# Patient Record
Sex: MALE | Race: WHITE | NOT HISPANIC OR LATINO | Employment: OTHER | URBAN - METROPOLITAN AREA
[De-identification: names, ages, dates, MRNs, and addresses within clinical notes are randomized per-mention and may not be internally consistent; named-entity substitution may affect disease eponyms.]

---

## 2019-08-03 ENCOUNTER — TRANSFERRED RECORDS (OUTPATIENT)
Dept: HEALTH INFORMATION MANAGEMENT | Facility: CLINIC | Age: 59
End: 2019-08-03

## 2019-08-03 LAB — INR PPP: 1.1 (ref 0.9–1.2)

## 2019-08-07 LAB — INR PPP: 1.1 (ref 0.9–1.2)

## 2019-09-03 ENCOUNTER — TRANSFERRED RECORDS (OUTPATIENT)
Dept: HEALTH INFORMATION MANAGEMENT | Facility: CLINIC | Age: 59
End: 2019-09-03

## 2019-09-11 ENCOUNTER — TRANSFERRED RECORDS (OUTPATIENT)
Dept: HEALTH INFORMATION MANAGEMENT | Facility: CLINIC | Age: 59
End: 2019-09-11

## 2019-09-17 ENCOUNTER — TRANSFERRED RECORDS (OUTPATIENT)
Dept: HEALTH INFORMATION MANAGEMENT | Facility: CLINIC | Age: 59
End: 2019-09-17

## 2019-09-25 ENCOUNTER — TRANSFERRED RECORDS (OUTPATIENT)
Dept: HEALTH INFORMATION MANAGEMENT | Facility: CLINIC | Age: 59
End: 2019-09-25

## 2019-09-30 ENCOUNTER — TRANSFERRED RECORDS (OUTPATIENT)
Dept: HEALTH INFORMATION MANAGEMENT | Facility: CLINIC | Age: 59
End: 2019-09-30

## 2019-10-15 ENCOUNTER — TRANSFERRED RECORDS (OUTPATIENT)
Dept: HEALTH INFORMATION MANAGEMENT | Facility: CLINIC | Age: 59
End: 2019-10-15

## 2019-10-16 ENCOUNTER — TRANSFERRED RECORDS (OUTPATIENT)
Dept: HEALTH INFORMATION MANAGEMENT | Facility: CLINIC | Age: 59
End: 2019-10-16

## 2019-10-21 ENCOUNTER — OFFICE VISIT (OUTPATIENT)
Dept: FAMILY MEDICINE | Facility: CLINIC | Age: 59
End: 2019-10-21
Payer: COMMERCIAL

## 2019-10-21 ENCOUNTER — TELEPHONE (OUTPATIENT)
Dept: FAMILY MEDICINE | Facility: CLINIC | Age: 59
End: 2019-10-21

## 2019-10-21 ENCOUNTER — TRANSFERRED RECORDS (OUTPATIENT)
Dept: HEALTH INFORMATION MANAGEMENT | Facility: CLINIC | Age: 59
End: 2019-10-21

## 2019-10-21 VITALS
WEIGHT: 262 LBS | OXYGEN SATURATION: 96 % | RESPIRATION RATE: 16 BRPM | DIASTOLIC BLOOD PRESSURE: 82 MMHG | TEMPERATURE: 97.5 F | HEART RATE: 80 BPM | SYSTOLIC BLOOD PRESSURE: 132 MMHG

## 2019-10-21 DIAGNOSIS — Z12.11 SPECIAL SCREENING FOR MALIGNANT NEOPLASMS, COLON: ICD-10-CM

## 2019-10-21 DIAGNOSIS — Z01.818 PREOP GENERAL PHYSICAL EXAM: Primary | ICD-10-CM
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
## 2019-10-21 DIAGNOSIS — S42.202D CLOSED FRACTURE OF PROXIMAL END OF LEFT HUMERUS WITH ROUTINE HEALING, UNSPECIFIED FRACTURE MORPHOLOGY, SUBSEQUENT ENCOUNTER: ICD-10-CM
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
## 2019-10-21 LAB — HGB BLD-MCNC: 14.9 G/DL (ref 13.3–17.7)

## 2019-10-21 PROCEDURE — 85018 HEMOGLOBIN: CPT | Performed by: FAMILY MEDICINE

## 2019-10-21 PROCEDURE — 36415 COLL VENOUS BLD VENIPUNCTURE: CPT | Performed by: FAMILY MEDICINE

## 2019-10-21 PROCEDURE — 93000 ELECTROCARDIOGRAM COMPLETE: CPT | Performed by: FAMILY MEDICINE

## 2019-10-21 PROCEDURE — 99204 OFFICE O/P NEW MOD 45 MIN: CPT | Performed by: FAMILY MEDICINE

## 2019-10-21 RX ORDER — HYDROCODONE BITARTRATE AND ACETAMINOPHEN 5; 325 MG/1; MG/1
TABLET ORAL
Refills: 0 | COMMUNITY
Start: 2019-09-23 | End: 2021-11-09

## 2019-10-21 SDOH — HEALTH STABILITY: MENTAL HEALTH: HOW OFTEN DO YOU HAVE A DRINK CONTAINING ALCOHOL?: NEVER

## 2019-10-21 NOTE — TELEPHONE ENCOUNTER
Message   Received: Today   Message Contents   Heena Best MD  P Fz Team Red             Need vascular surgery Notes today-Pt has surgery Tomorrow   Fax preop/labs/EKG if done to hospital
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
## 2019-10-21 NOTE — TELEPHONE ENCOUNTER
Called 2 times to [facility] to get notes per providers request. They were given the fax number to the Red Team.    [address], [address]  [place] [address]    Phone: [phone]    Patient is not to have surgery they are to have a graph put in at [place].     They can not fax over [facility]'s notes until the provider has signed them.    Note is currently unsigned by the provider Heena Best MD.    Will send to the provider to review. Roxanne Farr,

## 2019-10-22 NOTE — TELEPHONE ENCOUNTER
Called [facility] back. They had left a voicemail on the TC line asking for the Pre-op. Let them know we where sending it. Roxanne Farr,

## 2019-10-22 NOTE — TELEPHONE ENCOUNTER
Note has been completed. Confirmed fax of signed office note/pre-op 10/21/2019 to [facility]. Roxanne Farr,

## 2019-10-25 ENCOUNTER — OFFICE VISIT (OUTPATIENT)
Dept: FAMILY MEDICINE | Facility: CLINIC | Age: 59
End: 2019-10-25
Payer: COMMERCIAL

## 2019-10-25 VITALS
HEIGHT: 73 IN | OXYGEN SATURATION: 96 % | SYSTOLIC BLOOD PRESSURE: 126 MMHG | WEIGHT: 262.4 LBS | RESPIRATION RATE: 22 BRPM | HEART RATE: 87 BPM | DIASTOLIC BLOOD PRESSURE: 88 MMHG | BODY MASS INDEX: 34.78 KG/M2 | TEMPERATURE: 97.6 F

## 2019-10-25 DIAGNOSIS — G89.29 CHRONIC LOW BACK PAIN WITHOUT SCIATICA, UNSPECIFIED BACK PAIN LATERALITY: ICD-10-CM

## 2019-10-25 DIAGNOSIS — Z12.5 SCREENING FOR PROSTATE CANCER: ICD-10-CM

## 2019-10-25 DIAGNOSIS — E55.9 VITAMIN D DEFICIENCY: ICD-10-CM

## 2019-10-25 DIAGNOSIS — Z00.00 WELL ADULT EXAM: Primary | ICD-10-CM

## 2019-10-25 DIAGNOSIS — R35.0 URINARY FREQUENCY: ICD-10-CM

## 2019-10-25 DIAGNOSIS — K21.9 CHRONIC GERD: ICD-10-CM

## 2019-10-25 DIAGNOSIS — Z13.220 ENCOUNTER FOR LIPID SCREENING FOR CARDIOVASCULAR DISEASE: ICD-10-CM

## 2019-10-25 DIAGNOSIS — Z12.11 SPECIAL SCREENING FOR MALIGNANT NEOPLASMS, COLON: ICD-10-CM

## 2019-10-25 DIAGNOSIS — Z13.6 ENCOUNTER FOR LIPID SCREENING FOR CARDIOVASCULAR DISEASE: ICD-10-CM

## 2019-10-25 DIAGNOSIS — R13.19 ESOPHAGEAL DYSPHAGIA: ICD-10-CM

## 2019-10-25 DIAGNOSIS — M54.50 CHRONIC LOW BACK PAIN WITHOUT SCIATICA, UNSPECIFIED BACK PAIN LATERALITY: ICD-10-CM

## 2019-10-25 LAB
ALBUMIN UR-MCNC: NEGATIVE MG/DL
APPEARANCE UR: CLEAR
BACTERIA #/AREA URNS HPF: ABNORMAL /HPF
BILIRUB UR QL STRIP: NEGATIVE
COLOR UR AUTO: YELLOW
ERYTHROCYTE [DISTWIDTH] IN BLOOD BY AUTOMATED COUNT: 14.7 % (ref 10–15)
GLUCOSE UR STRIP-MCNC: NEGATIVE MG/DL
HCT VFR BLD AUTO: 46.8 % (ref 40–53)
HGB BLD-MCNC: 15.3 G/DL (ref 13.3–17.7)
HGB UR QL STRIP: NEGATIVE
KETONES UR STRIP-MCNC: NEGATIVE MG/DL
LEUKOCYTE ESTERASE UR QL STRIP: NEGATIVE
MCH RBC QN AUTO: 27.9 PG (ref 26.5–33)
MCHC RBC AUTO-ENTMCNC: 32.7 G/DL (ref 31.5–36.5)
MCV RBC AUTO: 85 FL (ref 78–100)
NITRATE UR QL: NEGATIVE
NON-SQ EPI CELLS #/AREA URNS LPF: ABNORMAL /LPF
PH UR STRIP: 5.5 PH (ref 5–7)
PLATELET # BLD AUTO: 191 10E9/L (ref 150–450)
RBC # BLD AUTO: 5.48 10E12/L (ref 4.4–5.9)
RBC #/AREA URNS AUTO: ABNORMAL /HPF
SOURCE: ABNORMAL
SP GR UR STRIP: 1.02 (ref 1–1.03)
UROBILINOGEN UR STRIP-ACNC: 0.2 EU/DL (ref 0.2–1)
WBC # BLD AUTO: 8.6 10E9/L (ref 4–11)
WBC #/AREA URNS AUTO: ABNORMAL /HPF

## 2019-10-25 PROCEDURE — 36415 COLL VENOUS BLD VENIPUNCTURE: CPT | Performed by: FAMILY MEDICINE

## 2019-10-25 PROCEDURE — 81001 URINALYSIS AUTO W/SCOPE: CPT | Performed by: FAMILY MEDICINE

## 2019-10-25 PROCEDURE — G0103 PSA SCREENING: HCPCS | Performed by: FAMILY MEDICINE

## 2019-10-25 PROCEDURE — 99396 PREV VISIT EST AGE 40-64: CPT | Performed by: FAMILY MEDICINE

## 2019-10-25 PROCEDURE — 85027 COMPLETE CBC AUTOMATED: CPT | Performed by: FAMILY MEDICINE

## 2019-10-25 PROCEDURE — 80053 COMPREHEN METABOLIC PANEL: CPT | Performed by: FAMILY MEDICINE

## 2019-10-25 PROCEDURE — 80061 LIPID PANEL: CPT | Performed by: FAMILY MEDICINE

## 2019-10-25 PROCEDURE — 82306 VITAMIN D 25 HYDROXY: CPT | Performed by: FAMILY MEDICINE

## 2019-10-25 PROCEDURE — 99214 OFFICE O/P EST MOD 30 MIN: CPT | Mod: 25 | Performed by: FAMILY MEDICINE

## 2019-10-25 RX ORDER — PHENOL 1.4 %
600 AEROSOL, SPRAY (ML) MUCOUS MEMBRANE 2 TIMES DAILY WITH MEALS
COMMUNITY
End: 2021-11-09

## 2019-10-25 RX ORDER — ASPIRIN 81 MG/1
81 TABLET ORAL DAILY
COMMUNITY
End: 2021-11-09

## 2019-10-25 RX ORDER — ACETAMINOPHEN 160 MG
2000 TABLET,DISINTEGRATING ORAL DAILY
COMMUNITY
End: 2021-11-09

## 2019-10-25 RX ORDER — DOCUSATE SODIUM 100 MG/1
100 CAPSULE, LIQUID FILLED ORAL 2 TIMES DAILY PRN
COMMUNITY
End: 2021-11-09

## 2019-10-25 ASSESSMENT — ENCOUNTER SYMPTOMS
FREQUENCY: 1
ABDOMINAL PAIN: 0
CHILLS: 0
HEARTBURN: 0
DIARRHEA: 0
FEVER: 0
HEMATURIA: 0
SHORTNESS OF BREATH: 1
WEAKNESS: 1
COUGH: 1
PALPITATIONS: 0
CONSTIPATION: 0
DIZZINESS: 1
SORE THROAT: 0
MYALGIAS: 1
JOINT SWELLING: 1
EYE PAIN: 0
ARTHRALGIAS: 1
PARESTHESIAS: 0
HEMATOCHEZIA: 0
HEADACHES: 0
DYSURIA: 0
NERVOUS/ANXIOUS: 1
NAUSEA: 0

## 2019-10-25 ASSESSMENT — MIFFLIN-ST. JEOR: SCORE: 2072.73

## 2019-10-25 NOTE — LETTER
[address] [place] [address]    October 31, 2019    Mauro Juárez  [address] [address]          Dear Mauro,    Your most recent labs are not concerning at this time.  Your Liver function, kidney function and electrolytes are all normal.  PSA is normal.  You have a healthy cholesterol panel as your LDL (bad cholesterol) is at 100, and your HDL (good cholesterol) is well above 45.  Please continue your healthy diet and regular exercise regimen discussed in clinic, and please let me know if you have any questions.     Enclosed is a copy of your results.     Results for orders placed or performed in visit on 10/25/19   Lipid panel reflex to direct LDL Non-fasting   Result Value Ref Range    Cholesterol 188 <200 mg/dL    Triglycerides 74 <150 mg/dL    HDL Cholesterol 73 >39 mg/dL    LDL Cholesterol Calculated 100 (H) <100 mg/dL    Non HDL Cholesterol 115 <130 mg/dL   CBC with platelets   Result Value Ref Range    WBC 8.6 4.0 - 11.0 10e9/L    RBC Count 5.48 4.4 - 5.9 10e12/L    Hemoglobin 15.3 13.3 - 17.7 g/dL    Hematocrit 46.8 40.0 - 53.0 %    MCV 85 78 - 100 fl    MCH 27.9 26.5 - 33.0 pg    MCHC 32.7 31.5 - 36.5 g/dL    RDW 14.7 10.0 - 15.0 %    Platelet Count 191 150 - 450 10e9/L   Comprehensive metabolic panel   Result Value Ref Range    Sodium 140 133 - 144 mmol/L    Potassium 4.2 3.4 - 5.3 mmol/L    Chloride 106 94 - 109 mmol/L    Carbon Dioxide 26 20 - 32 mmol/L    Anion Gap 8 3 - 14 mmol/L    Glucose 90 70 - 99 mg/dL    Urea Nitrogen 17 7 - 30 mg/dL    Creatinine 0.77 0.66 - 1.25 mg/dL    GFR Estimate >90 >60 mL/min/[1.73_m2]    GFR Estimate If Black >90 >60 mL/min/[1.73_m2]    Calcium 9.9 8.5 - 10.1 mg/dL    Bilirubin Total 0.7 0.2 - 1.3 mg/dL    Albumin 4.1 3.4 - 5.0 g/dL    Protein Total 8.4 6.8 - 8.8 g/dL    Alkaline Phosphatase 178 (H) 40 - 150 U/L    ALT 54 0 - 70 U/L    AST 28 0 - 45 U/L   PSA,  screen   Result Value Ref Range    PSA 1.39 0 - 4 ug/L   UA with Microscopic reflex to Culture   Result Value Ref Range    Color Urine Yellow     Appearance Urine Clear     Glucose Urine Negative NEG^Negative mg/dL    Bilirubin Urine Negative NEG^Negative    Ketones Urine Negative NEG^Negative mg/dL    Specific Gravity Urine 1.025 1.003 - 1.035    pH Urine 5.5 5.0 - 7.0 pH    Protein Albumin Urine Negative NEG^Negative mg/dL    Urobilinogen Urine 0.2 0.2 - 1.0 EU/dL    Nitrite Urine Negative NEG^Negative    Blood Urine Negative NEG^Negative    Leukocyte Esterase Urine Negative NEG^Negative    Source Midstream Urine     WBC Urine 0 - 5 OTO5^0 - 5 /HPF    RBC Urine O - 2 OTO2^O - 2 /HPF    Squamous Epithelial /LPF Urine Few FEW^Few /LPF    Bacteria Urine Few (A) NEG^Negative /HPF   Vitamin D deficiency screening   Result Value Ref Range    Vitamin D Deficiency screening 38 20 - 75 ug/L       If you have any questions or concerns, please call myself or my nurse at [phone].      Sincerely,        Jn Gregory MD/carmen
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
## 2019-10-27 ENCOUNTER — MYC MEDICAL ADVICE (OUTPATIENT)
Dept: FAMILY MEDICINE | Facility: CLINIC | Age: 59
End: 2019-10-27

## 2019-10-28 ENCOUNTER — HEALTH MAINTENANCE LETTER (OUTPATIENT)
Age: 59
End: 2019-10-28

## 2019-10-28 ENCOUNTER — TELEPHONE (OUTPATIENT)
Dept: PALLIATIVE MEDICINE | Facility: CLINIC | Age: 59
End: 2019-10-28

## 2019-10-28 LAB
ALBUMIN SERPL-MCNC: 4.1 G/DL (ref 3.4–5)
ALP SERPL-CCNC: 178 U/L (ref 40–150)
ALT SERPL W P-5'-P-CCNC: 54 U/L (ref 0–70)
ANION GAP SERPL CALCULATED.3IONS-SCNC: 8 MMOL/L (ref 3–14)
AST SERPL W P-5'-P-CCNC: 28 U/L (ref 0–45)
BILIRUB SERPL-MCNC: 0.7 MG/DL (ref 0.2–1.3)
BUN SERPL-MCNC: 17 MG/DL (ref 7–30)
CALCIUM SERPL-MCNC: 9.9 MG/DL (ref 8.5–10.1)
CHLORIDE SERPL-SCNC: 106 MMOL/L (ref 94–109)
CHOLEST SERPL-MCNC: 188 MG/DL
CO2 SERPL-SCNC: 26 MMOL/L (ref 20–32)
CREAT SERPL-MCNC: 0.77 MG/DL (ref 0.66–1.25)
DEPRECATED CALCIDIOL+CALCIFEROL SERPL-MC: 38 UG/L (ref 20–75)
GFR SERPL CREATININE-BSD FRML MDRD: >90 ML/MIN/{1.73_M2}
GLUCOSE SERPL-MCNC: 90 MG/DL (ref 70–99)
HDLC SERPL-MCNC: 73 MG/DL
LDLC SERPL CALC-MCNC: 100 MG/DL
NONHDLC SERPL-MCNC: 115 MG/DL
POTASSIUM SERPL-SCNC: 4.2 MMOL/L (ref 3.4–5.3)
PROT SERPL-MCNC: 8.4 G/DL (ref 6.8–8.8)
PSA SERPL-ACNC: 1.39 UG/L (ref 0–4)
SODIUM SERPL-SCNC: 140 MMOL/L (ref 133–144)
TRIGL SERPL-MCNC: 74 MG/DL

## 2019-10-28 NOTE — TELEPHONE ENCOUNTER

## 2019-10-30 ENCOUNTER — TELEPHONE (OUTPATIENT)
Dept: FAMILY MEDICINE | Facility: CLINIC | Age: 59
End: 2019-10-30

## 2019-10-30 NOTE — TELEPHONE ENCOUNTER
"Dr. Gregory,  Please see phone message below from patient. This is second message form patient.   Golden Gekkot message 10/27 stated: \"My pharmacy did not get the prescription for the enlarged prostate.  It should be sent to [facility] in [facility] [place] on [address] Thank you\"    Pharmacy cued.  "
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
## 2019-10-30 NOTE — TELEPHONE ENCOUNTER
Reason for Call:  Other prescription    Detailed comments: Prescription didn't show up in [employer] in [place], wondering if it was sent somewhere else. Wants call back.      Phone Number Patient can be reached at: Other phone number: [phone]    Mobile number: [phone]      Best Time: any    Can we leave a detailed message on this number? YES    Call taken on 10/30/2019 at 12:21 PM by Valdez Meza

## 2019-10-31 ENCOUNTER — OFFICE VISIT (OUTPATIENT)
Dept: PALLIATIVE MEDICINE | Facility: CLINIC | Age: 59
End: 2019-10-31
Attending: FAMILY MEDICINE
Payer: COMMERCIAL

## 2019-10-31 VITALS
BODY MASS INDEX: 34.35 KG/M2 | HEART RATE: 56 BPM | DIASTOLIC BLOOD PRESSURE: 99 MMHG | SYSTOLIC BLOOD PRESSURE: 150 MMHG | WEIGHT: 262 LBS

## 2019-10-31 DIAGNOSIS — M25.512 CHRONIC PAIN OF BOTH SHOULDERS: ICD-10-CM

## 2019-10-31 DIAGNOSIS — M79.605 CHRONIC PAIN OF LEFT LOWER EXTREMITY: ICD-10-CM

## 2019-10-31 DIAGNOSIS — G89.4 PAIN SYNDROME, CHRONIC: ICD-10-CM

## 2019-10-31 DIAGNOSIS — G89.29 CHRONIC PAIN OF LEFT LOWER EXTREMITY: ICD-10-CM

## 2019-10-31 DIAGNOSIS — G89.29 CHRONIC PAIN OF BOTH SHOULDERS: ICD-10-CM

## 2019-10-31 DIAGNOSIS — N40.1 BENIGN PROSTATIC HYPERPLASIA WITH LOWER URINARY TRACT SYMPTOMS, SYMPTOM DETAILS UNSPECIFIED: Primary | ICD-10-CM

## 2019-10-31 DIAGNOSIS — M54.50 BILATERAL LOW BACK PAIN WITHOUT SCIATICA, UNSPECIFIED CHRONICITY: Primary | ICD-10-CM

## 2019-10-31 DIAGNOSIS — M25.511 CHRONIC PAIN OF BOTH SHOULDERS: ICD-10-CM

## 2019-10-31 PROCEDURE — 99207 ZZC CDG-MDM COMPONENT: MEETS LOW - DOWN CODED: CPT | Performed by: NURSE PRACTITIONER

## 2019-10-31 PROCEDURE — 99204 OFFICE O/P NEW MOD 45 MIN: CPT | Performed by: NURSE PRACTITIONER

## 2019-10-31 RX ORDER — GABAPENTIN 100 MG/1
CAPSULE ORAL
Qty: 180 CAPSULE | Refills: 0 | Status: SHIPPED | OUTPATIENT
Start: 2019-10-31 | End: 2019-12-18

## 2019-10-31 RX ORDER — TAMSULOSIN HYDROCHLORIDE 0.4 MG/1
0.4 CAPSULE ORAL DAILY
Qty: 90 CAPSULE | Refills: 0 | Status: SHIPPED | OUTPATIENT
Start: 2019-10-31 | End: 2020-01-28

## 2019-10-31 ASSESSMENT — PAIN SCALES - GENERAL: PAINLEVEL: MODERATE PAIN (5)

## 2019-10-31 NOTE — PATIENT INSTRUCTIONS
After Visit Instructions:     Thank you for coming to [facility] for your care. It is my goal to partner with you to help you reach your optimal state of health.     Continue daily self-care, identifying contributing factors, and monitoring variations in pain level. Continue to integrate self-care into your life.      1. Schedule physical therapy assessment with Bhavya  2. Schedule follow-up with CARA Padilla NP-C a few days after MRI  3. Imaging: MRI of low back   [facility]:   Please call to schedule: [phone]  4. Procedures recommended: Will review at next appt   5. Order for TENS unit provided.   6. Medication recommendations:   1. Gabapentin 100 mg   Here is how you should take your Neurontin  (gabapentin):  Day 1: _______________Take one 100 mg capsule at bedtime for three days.  Day 4: _______________Take one 100 mg capsule in the morning and one 100 mg capsule  at bedtime. Do this for three days.  Day 7:_______________ Take one 100 mg capsule three times a day.  Day 10: ______________Take one 100 mg capsule in the morning, one 100 mg capsule midday and two 100 mg capsules at bedtime.  Day 13:______________ Take two 100 mg capsules in the morning, one 100 mg capsule  midday and two 100mg capsules at bedtime.  Day 16:______________Take two 100 mg capsules three times a day.  Day 19:______________Take two 100mg capsules in the morning, two 100mg capsules  midday and three 100mg capsules at bedtime.  Day 22:______________Take three 100mg capsules in the morning, two 100mg capsules  midday and three 100mg tablets at bedtime.  Day 25: ______________Take three 100 mg tablets three times a day. Continue at this dose.    Additional Instructions  Caution for sedation.    Do not drive until you know how the medication affects you.   You can increase more slowly if you need to.  Do not stop abruptly once at higher doses.  This medication must be tapered off.      Juliette RAO  CARA Kee, NP-C  [facility]  [facility]    Clinic Number:  [phone]     Call with any questions about your care and for scheduling assistance.     Calls are returned Monday through Friday between 8 AM and 4:30 PM. We usually get back to you within 2 business days depending on the issue/request.    If we are prescribing your medications:    For opioid medication refills, call the clinic or send a [employer] message 7 days in advance.  Please include:    Name of requested medication    Name of the pharmacy.    For non-opioid medications, call your pharmacy directly to request a refill. Please allow 3-4 days to be processed.     Per [place] State Law:    All controlled substance prescriptions must be filled within 30 days of being written.      For those controlled substances allowing refills, pickup must occur within 30 days of last fill.      We believe regular attendance is key to your success in our program!      Any time you are unable to keep your appointment we ask that you call us at least 24 hours in advance to cancel.This will allow us to offer the appointment time to another patient.   Multiple missed appointments may lead to dismissal from the clinic.

## 2019-11-04 ENCOUNTER — OFFICE VISIT (OUTPATIENT)
Dept: GASTROENTEROLOGY | Facility: CLINIC | Age: 59
End: 2019-11-04
Attending: FAMILY MEDICINE
Payer: COMMERCIAL

## 2019-11-04 VITALS
HEART RATE: 79 BPM | BODY MASS INDEX: 35.92 KG/M2 | WEIGHT: 271 LBS | OXYGEN SATURATION: 97 % | HEIGHT: 73 IN | DIASTOLIC BLOOD PRESSURE: 88 MMHG | SYSTOLIC BLOOD PRESSURE: 129 MMHG

## 2019-11-04 DIAGNOSIS — Z12.11 ENCOUNTER FOR SCREENING COLONOSCOPY: ICD-10-CM

## 2019-11-04 DIAGNOSIS — Z79.1 NSAID LONG-TERM USE: ICD-10-CM

## 2019-11-04 DIAGNOSIS — K21.9 GASTROESOPHAGEAL REFLUX DISEASE, ESOPHAGITIS PRESENCE NOT SPECIFIED: ICD-10-CM

## 2019-11-04 DIAGNOSIS — R13.10 DYSPHAGIA, UNSPECIFIED TYPE: Primary | ICD-10-CM

## 2019-11-04 PROCEDURE — 99204 OFFICE O/P NEW MOD 45 MIN: CPT | Performed by: PHYSICIAN ASSISTANT

## 2019-11-04 RX ORDER — OMEPRAZOLE 40 MG/1
40 CAPSULE, DELAYED RELEASE ORAL EVERY MORNING
Qty: 30 CAPSULE | Refills: 2 | Status: SHIPPED | OUTPATIENT
Start: 2019-11-04 | End: 2021-11-09

## 2019-11-04 ASSESSMENT — MIFFLIN-ST. JEOR: SCORE: 2111.78

## 2019-11-04 ASSESSMENT — PAIN SCALES - GENERAL: PAINLEVEL: SEVERE PAIN (7)

## 2019-11-04 NOTE — PATIENT INSTRUCTIONS
Thank you for visiting our Gastroenterology office today.     Start taking omeprazole 40 mg every morning on an empty stomach about 30 minutes before breakfast.     Please call [phone] to schedule an upper endoscopy and colonoscopy with Dr. Liz or Dr. Cheek.     Schedule a follow up for 2-3 weeks after your procedure to review results and discuss next steps if any are needed.
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
## 2019-11-04 NOTE — NURSING NOTE
"Mauro Juárez's goals for this visit include:   Chief Complaint   Patient presents with     Consult     Patient states that when he swallows food sometimes it gets stuck in his lower esophagus; GERD issues       He requests these members of his care team be copied on today's visit information: PCP    PCP: Jn Gregory    Referring Provider:  Jn Gregory MD  [address] [place] [address]    /88 (BP Location: Right arm, Patient Position: Sitting, Cuff Size: Adult Regular)   Pulse 79   Ht 1.86 m (6' 1.23\")   Wt 122.9 kg (271 lb)   SpO2 97%   BMI 35.53 kg/m      Do you need any medication refills at today's visit? No    Neeta Head LPN      "
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
## 2019-11-07 PROBLEM — G89.4 CHRONIC PAIN SYNDROME: Status: ACTIVE | Noted: 2019-11-07

## 2019-11-11 ENCOUNTER — OFFICE VISIT (OUTPATIENT)
Dept: PALLIATIVE MEDICINE | Facility: CLINIC | Age: 59
End: 2019-11-11
Payer: COMMERCIAL

## 2019-11-11 DIAGNOSIS — G89.29 CHRONIC PAIN OF LEFT LOWER EXTREMITY: ICD-10-CM

## 2019-11-11 DIAGNOSIS — M79.605 CHRONIC PAIN OF LEFT LOWER EXTREMITY: ICD-10-CM

## 2019-11-11 DIAGNOSIS — G89.4 CHRONIC PAIN SYNDROME: Primary | ICD-10-CM

## 2019-11-11 DIAGNOSIS — M54.50 BILATERAL LOW BACK PAIN WITHOUT SCIATICA, UNSPECIFIED CHRONICITY: ICD-10-CM

## 2019-11-11 PROCEDURE — 97112 NEUROMUSCULAR REEDUCATION: CPT | Mod: GP | Performed by: PHYSICAL THERAPIST

## 2019-11-11 PROCEDURE — 97162 PT EVAL MOD COMPLEX 30 MIN: CPT | Mod: GP | Performed by: PHYSICAL THERAPIST
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
## 2019-11-14 ENCOUNTER — TELEPHONE (OUTPATIENT)
Dept: GASTROENTEROLOGY | Facility: CLINIC | Age: 59
End: 2019-11-14

## 2019-11-14 ENCOUNTER — ANCILLARY PROCEDURE (OUTPATIENT)
Dept: MRI IMAGING | Facility: CLINIC | Age: 59
End: 2019-11-14
Attending: NURSE PRACTITIONER
Payer: COMMERCIAL

## 2019-11-14 DIAGNOSIS — M54.50 BILATERAL LOW BACK PAIN WITHOUT SCIATICA, UNSPECIFIED CHRONICITY: ICD-10-CM

## 2019-11-14 PROBLEM — M79.605 CHRONIC PAIN OF LEFT LOWER EXTREMITY: Status: ACTIVE | Noted: 2019-11-14

## 2019-11-14 PROBLEM — G89.29 CHRONIC PAIN OF LEFT LOWER EXTREMITY: Status: ACTIVE | Noted: 2019-11-14

## 2019-11-14 PROCEDURE — 72148 MRI LUMBAR SPINE W/O DYE: CPT | Mod: TC

## 2019-11-14 NOTE — TELEPHONE ENCOUNTER
Pt's wife spoke to Loly and scheduled procedures with Dr. Liz. She has no other questions.    Dedra Lynn RN  Gastroenterology Care Coordinator  [place], [place]

## 2019-11-14 NOTE — TELEPHONE ENCOUNTER
[facility] Call Center    Phone Message    May a detailed message be left on voicemail: yes    Reason for Call: Other: Appointment for MAC EGD and Colonoscopy.      Patients wife states shes tried many times to get it scheduled and no one has helped her make it.     Action Taken: Message routed to:  Adult Clinics: Gastroenterology (GI) p [phone]

## 2019-11-18 ENCOUNTER — OFFICE VISIT (OUTPATIENT)
Dept: PALLIATIVE MEDICINE | Facility: CLINIC | Age: 59
End: 2019-11-18
Payer: COMMERCIAL

## 2019-11-18 VITALS
DIASTOLIC BLOOD PRESSURE: 85 MMHG | WEIGHT: 271 LBS | HEART RATE: 86 BPM | BODY MASS INDEX: 35.53 KG/M2 | SYSTOLIC BLOOD PRESSURE: 130 MMHG

## 2019-11-18 DIAGNOSIS — M48.061 SPINAL STENOSIS OF LUMBAR REGION WITHOUT NEUROGENIC CLAUDICATION: ICD-10-CM

## 2019-11-18 DIAGNOSIS — M51.369 DDD (DEGENERATIVE DISC DISEASE), LUMBAR: Primary | ICD-10-CM

## 2019-11-18 PROCEDURE — 99214 OFFICE O/P EST MOD 30 MIN: CPT | Performed by: NURSE PRACTITIONER

## 2019-11-18 ASSESSMENT — PAIN SCALES - GENERAL: PAINLEVEL: MILD PAIN (3)
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
## 2019-11-18 NOTE — PATIENT INSTRUCTIONS
After Visit Instructions:     Thank you for coming to [facility] for your care. It is my goal to partner with you to help you reach your optimal state of health.     Continue daily self-care, identifying contributing factors, and monitoring variations in pain level. Continue to integrate self-care into your life.      1. Schedule physical therapy visits per Bhavya's recommendations   2. Schedule follow-up with CARA Padilla NP-C in 3 months or sooner as needed.   3. Procedures recommended: Lumbar epidural steroid injection at  L 3-4, 4-5. Let us know if you want to proceed with this injection.   4. Medication recommendations:   1. Ok to increase Gabapentin as discussed.     CARA Fish, NP-C  [facility] [place] [facility] [place]    Clinic Number:  [phone]     Call with any questions about your care and for scheduling assistance.     Calls are returned Monday through Friday between 8 AM and 4:30 PM. We usually get back to you within 2 business days depending on the issue/request.    If we are prescribing your medications:    For opioid medication refills, call the clinic or send a Spacenet message 7 days in advance.  Please include:    Name of requested medication    Name of the pharmacy.    For non-opioid medications, call your pharmacy directly to request a refill. Please allow 3-4 days to be processed.     Per [place] State Law:    All controlled substance prescriptions must be filled within 30 days of being written.      For those controlled substances allowing refills, pickup must occur within 30 days of last fill.      We believe regular attendance is key to your success in our program!      Any time you are unable to keep your appointment we ask that you call us at least 24 hours in advance to cancel.This will allow us to offer the appointment time to another patient.   Multiple missed appointments may lead to dismissal from the clinic.

## 2019-11-20 ENCOUNTER — TELEPHONE (OUTPATIENT)
Dept: FAMILY MEDICINE | Facility: CLINIC | Age: 59
End: 2019-11-20

## 2019-11-20 NOTE — TELEPHONE ENCOUNTER
Reason for Call:  Other call back    Detailed comments: Patient wants to know if he has to come in for another pre op for his colonoscopy schedule for 11/27/19. He had a pre-op with Dr. Best on 10/21.    Phone Number Patient can be reached at: Home number on file [phone] (home)    Best Time: ASAP    Can we leave a detailed message on this number? YES    Call taken on 11/20/2019 at 10:10 AM by [place]

## 2019-11-20 NOTE — TELEPHONE ENCOUNTER
Huddled with Dr. Best.   Patient should check with GI doctor performing procedure and if they are OK if procedure is not within 30 days she can complete pre-op    Called and spoke with patient.  He reports that he already contact GI and they stated it was up to Dr. Best if another pre-op should be completed but they are OK with it being 36 days, instead of 30 days.   Would need pre-op from 10/21/19 updated to OK him for colonoscopy and EGD on 11/27/19.     Teri Bragg RN

## 2019-11-20 NOTE — LETTER
[facility]  [address]  [place] [address]  [phone]          November 27, 2019    RE:  Mauro FAIR Franck                                                                                                                                                       [address]  [place] [address]            To whom it may concern:    Mauro Juárez is under my professional care  on 10-21-19 for a Preop and was okay for surgery.  Sincerely,        Heena Best MD

## 2019-11-21 ENCOUNTER — OFFICE VISIT (OUTPATIENT)
Dept: PALLIATIVE MEDICINE | Facility: CLINIC | Age: 59
End: 2019-11-21
Payer: COMMERCIAL

## 2019-11-21 DIAGNOSIS — G89.4 CHRONIC PAIN SYNDROME: Primary | ICD-10-CM

## 2019-11-21 DIAGNOSIS — M79.605 CHRONIC PAIN OF LEFT LOWER EXTREMITY: ICD-10-CM

## 2019-11-21 DIAGNOSIS — G89.29 CHRONIC PAIN OF LEFT LOWER EXTREMITY: ICD-10-CM

## 2019-11-21 DIAGNOSIS — M54.50 BILATERAL LOW BACK PAIN WITHOUT SCIATICA, UNSPECIFIED CHRONICITY: ICD-10-CM

## 2019-11-21 PROCEDURE — 97110 THERAPEUTIC EXERCISES: CPT | Mod: GP | Performed by: PHYSICAL THERAPIST

## 2019-11-21 PROCEDURE — 97112 NEUROMUSCULAR REEDUCATION: CPT | Mod: GP | Performed by: PHYSICAL THERAPIST
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
## 2019-11-26 ENCOUNTER — ANESTHESIA EVENT (OUTPATIENT)
Dept: SURGERY | Facility: AMBULATORY SURGERY CENTER | Age: 59
End: 2019-11-26

## 2019-11-27 ENCOUNTER — SURGERY (OUTPATIENT)
Age: 59
End: 2019-11-27
Payer: COMMERCIAL

## 2019-11-27 ENCOUNTER — HOSPITAL ENCOUNTER (OUTPATIENT)
Facility: AMBULATORY SURGERY CENTER | Age: 59
Discharge: HOME | End: 2019-11-27
Attending: INTERNAL MEDICINE | Admitting: INTERNAL MEDICINE
Payer: COMMERCIAL

## 2019-11-27 ENCOUNTER — ANESTHESIA (OUTPATIENT)
Dept: SURGERY | Facility: AMBULATORY SURGERY CENTER | Age: 59
End: 2019-11-27
Payer: COMMERCIAL

## 2019-11-27 VITALS
SYSTOLIC BLOOD PRESSURE: 115 MMHG | TEMPERATURE: 96.5 F | DIASTOLIC BLOOD PRESSURE: 88 MMHG | RESPIRATION RATE: 16 BRPM | OXYGEN SATURATION: 95 %

## 2019-11-27 VITALS — HEART RATE: 76 BPM

## 2019-11-27 LAB
COLONOSCOPY: NORMAL
UPPER GI ENDOSCOPY: NORMAL

## 2019-11-27 PROCEDURE — 45390 COLONOSCOPY W/RESECTION: CPT | Mod: 59 | Performed by: INTERNAL MEDICINE

## 2019-11-27 PROCEDURE — G8918 PT W/O PREOP ORDER IV AB PRO: HCPCS

## 2019-11-27 PROCEDURE — 45381 COLONOSCOPY SUBMUCOUS NJX: CPT

## 2019-11-27 PROCEDURE — 45385 COLONOSCOPY W/LESION REMOVAL: CPT

## 2019-11-27 PROCEDURE — 88305 TISSUE EXAM BY PATHOLOGIST: CPT | Performed by: INTERNAL MEDICINE

## 2019-11-27 PROCEDURE — 43249 ESOPH EGD DILATION <30 MM: CPT | Mod: 51 | Performed by: INTERNAL MEDICINE

## 2019-11-27 PROCEDURE — 45385 COLONOSCOPY W/LESION REMOVAL: CPT | Performed by: INTERNAL MEDICINE

## 2019-11-27 PROCEDURE — G8907 PT DOC NO EVENTS ON DISCHARG: HCPCS

## 2019-11-27 PROCEDURE — 43249 ESOPH EGD DILATION <30 MM: CPT

## 2019-11-27 RX ORDER — OXYCODONE HYDROCHLORIDE 5 MG/1
5 TABLET ORAL EVERY 4 HOURS PRN
Status: DISCONTINUED | OUTPATIENT
Start: 2019-11-27 | End: 2019-11-28 | Stop reason: HOSPADM

## 2019-11-27 RX ORDER — PROPOFOL 10 MG/ML
INJECTION, EMULSION INTRAVENOUS CONTINUOUS PRN
Status: DISCONTINUED | OUTPATIENT
Start: 2019-11-27 | End: 2019-11-27

## 2019-11-27 RX ORDER — ONDANSETRON 2 MG/ML
4 INJECTION INTRAMUSCULAR; INTRAVENOUS EVERY 6 HOURS PRN
Status: DISCONTINUED | OUTPATIENT
Start: 2019-11-27 | End: 2019-11-28 | Stop reason: HOSPADM

## 2019-11-27 RX ORDER — NALOXONE HYDROCHLORIDE 0.4 MG/ML
.1-.4 INJECTION, SOLUTION INTRAMUSCULAR; INTRAVENOUS; SUBCUTANEOUS
Status: DISCONTINUED | OUTPATIENT
Start: 2019-11-27 | End: 2019-11-28 | Stop reason: HOSPADM

## 2019-11-27 RX ORDER — FENTANYL CITRATE 50 UG/ML
25-50 INJECTION, SOLUTION INTRAMUSCULAR; INTRAVENOUS EVERY 5 MIN PRN
Status: DISCONTINUED | OUTPATIENT
Start: 2019-11-27 | End: 2019-11-28 | Stop reason: HOSPADM

## 2019-11-27 RX ORDER — FLUMAZENIL 0.1 MG/ML
0.2 INJECTION, SOLUTION INTRAVENOUS
Status: DISCONTINUED | OUTPATIENT
Start: 2019-11-27 | End: 2019-11-28 | Stop reason: HOSPADM

## 2019-11-27 RX ORDER — ONDANSETRON 4 MG/1
4 TABLET, ORALLY DISINTEGRATING ORAL EVERY 30 MIN PRN
Status: DISCONTINUED | OUTPATIENT
Start: 2019-11-27 | End: 2019-11-28 | Stop reason: HOSPADM

## 2019-11-27 RX ORDER — ONDANSETRON 4 MG/1
4 TABLET, ORALLY DISINTEGRATING ORAL EVERY 6 HOURS PRN
Status: DISCONTINUED | OUTPATIENT
Start: 2019-11-27 | End: 2019-11-28 | Stop reason: HOSPADM

## 2019-11-27 RX ORDER — SODIUM CHLORIDE, SODIUM LACTATE, POTASSIUM CHLORIDE, CALCIUM CHLORIDE 600; 310; 30; 20 MG/100ML; MG/100ML; MG/100ML; MG/100ML
INJECTION, SOLUTION INTRAVENOUS CONTINUOUS
Status: DISCONTINUED | OUTPATIENT
Start: 2019-11-27 | End: 2019-11-28 | Stop reason: HOSPADM

## 2019-11-27 RX ORDER — ONDANSETRON 2 MG/ML
4 INJECTION INTRAMUSCULAR; INTRAVENOUS
Status: DISCONTINUED | OUTPATIENT
Start: 2019-11-27 | End: 2019-11-28 | Stop reason: HOSPADM

## 2019-11-27 RX ORDER — MEPERIDINE HYDROCHLORIDE 25 MG/ML
12.5 INJECTION INTRAMUSCULAR; INTRAVENOUS; SUBCUTANEOUS
Status: DISCONTINUED | OUTPATIENT
Start: 2019-11-27 | End: 2019-11-28 | Stop reason: HOSPADM

## 2019-11-27 RX ORDER — LIDOCAINE 40 MG/G
CREAM TOPICAL
Status: DISCONTINUED | OUTPATIENT
Start: 2019-11-27 | End: 2019-11-28 | Stop reason: HOSPADM

## 2019-11-27 RX ORDER — ONDANSETRON 2 MG/ML
4 INJECTION INTRAMUSCULAR; INTRAVENOUS EVERY 30 MIN PRN
Status: DISCONTINUED | OUTPATIENT
Start: 2019-11-27 | End: 2019-11-28 | Stop reason: HOSPADM

## 2019-11-27 RX ORDER — LIDOCAINE HYDROCHLORIDE 20 MG/ML
INJECTION, SOLUTION INFILTRATION; PERINEURAL PRN
Status: DISCONTINUED | OUTPATIENT
Start: 2019-11-27 | End: 2019-11-27

## 2019-11-27 RX ORDER — PROPOFOL 10 MG/ML
INJECTION, EMULSION INTRAVENOUS PRN
Status: DISCONTINUED | OUTPATIENT
Start: 2019-11-27 | End: 2019-11-27

## 2019-11-27 RX ADMIN — PROPOFOL 20 MG: 10 INJECTION, EMULSION INTRAVENOUS at 11:17

## 2019-11-27 RX ADMIN — SODIUM CHLORIDE, SODIUM LACTATE, POTASSIUM CHLORIDE, CALCIUM CHLORIDE: 600; 310; 30; 20 INJECTION, SOLUTION INTRAVENOUS at 11:00

## 2019-11-27 RX ADMIN — PROPOFOL 100 MG: 10 INJECTION, EMULSION INTRAVENOUS at 11:16

## 2019-11-27 RX ADMIN — LIDOCAINE HYDROCHLORIDE 100 MG: 20 INJECTION, SOLUTION INFILTRATION; PERINEURAL at 11:16

## 2019-11-27 RX ADMIN — PROPOFOL 150 MCG/KG/MIN: 10 INJECTION, EMULSION INTRAVENOUS at 11:17

## 2019-11-27 NOTE — ANESTHESIA PREPROCEDURE EVALUATION
Anesthesia Pre-Procedure Evaluation    Patient: Mauro Juárez   MRN:     [id number] Gender:   male   Age:    59 year old :      1960        Preoperative Diagnosis: * No pre-op diagnosis entered *   Procedure(s):  COLONOSCOPY, WITH CO2 INSUFFLATION  ESOPHAGOGASTRODUODENOSCOPY, WITH CO2 INSUFFLATION     Past Medical History:   Diagnosis Date     Former tobacco use       Past Surgical History:   Procedure Laterality Date     OPEN REDUCTION INTERNAL FIXATION RODDING INTRAMEDULLARY HUMERUS           right Hip orif      2019     right rotator cuff      2019          Anesthesia Evaluation     .             ROS/MED HX    ENT/Pulmonary:  - neg pulmonary ROS     Neurologic:  - neg neurologic ROS     Cardiovascular: Comment: Venous insufficiency: chronic ulcer left leg - neg cardiovascular ROS   (+) hypertension----. : . . . :. .       METS/Exercise Tolerance:     Hematologic:  - neg hematologic  ROS       Musculoskeletal:  - neg musculoskeletal ROS       GI/Hepatic:  - neg GI/hepatic ROS   (+) GERD       Renal/Genitourinary:  - ROS Renal section negative       Endo:  - neg endo ROS       Psychiatric:  - neg psychiatric ROS       Infectious Disease:  - neg infectious disease ROS       Malignancy:      - no malignancy   Other:    - neg other ROS                     PHYSICAL EXAM:   Mental Status/Neuro: A/A/O   Airway: Facies: Feasible  Mallampati: III  Mouth/Opening: Full  TM distance: > 6 cm  Neck ROM: Full   Respiratory: Auscultation: CTAB     Resp. Rate: Normal     Resp. Effort: Normal      CV: Rhythm: Regular  Rate: Age appropriate  Heart: Normal Sounds  Edema: None   Comments:      Dental: Normal Dentition                LABS:  CBC:   Lab Results   Component Value Date    WBC 8.6 10/25/2019    HGB 15.3 10/25/2019    HGB 14.9 10/21/2019    HCT 46.8 10/25/2019     10/25/2019     BMP:   Lab Results   Component Value Date     10/25/2019    POTASSIUM 4.2 10/25/2019    CHLORIDE 106  "10/25/2019    CO2 26 10/25/2019    BUN 17 10/25/2019    CR 0.77 10/25/2019    GLC 90 10/25/2019     COAGS:   Lab Results   Component Value Date    INR 1.1 08/07/2019     POC: No results found for: BGM, HCG, HCGS  OTHER:   Lab Results   Component Value Date    CAROLA 9.9 10/25/2019    ALBUMIN 4.1 10/25/2019    PROTTOTAL 8.4 10/25/2019    ALT 54 10/25/2019    AST 28 10/25/2019    ALKPHOS 178 (H) 10/25/2019    BILITOTAL 0.7 10/25/2019        Preop Vitals    BP Readings from Last 3 Encounters:   11/27/19 (!) 133/90   11/18/19 130/85   11/04/19 129/88    Pulse Readings from Last 3 Encounters:   11/18/19 86   11/04/19 79   10/31/19 56      Resp Readings from Last 3 Encounters:   11/27/19 16   10/25/19 22   10/21/19 16    SpO2 Readings from Last 3 Encounters:   11/27/19 97%   11/04/19 97%   10/25/19 96%      Temp Readings from Last 1 Encounters:   11/27/19 35.8  C (96.5  F) (Temporal)    Ht Readings from Last 1 Encounters:   11/04/19 1.86 m (6' 1.23\")      Wt Readings from Last 1 Encounters:   11/18/19 122.9 kg (271 lb)    Estimated body mass index is 35.53 kg/m  as calculated from the following:    Height as of 11/4/19: 1.86 m (6' 1.23\").    Weight as of 11/18/19: 122.9 kg (271 lb).     LDA:        Assessment:   ASA SCORE: 2    H&P: History and physical reviewed and following examination; no interval change.   Smoking Status:  Non-Smoker/Unknown   NPO Status: NPO Appropriate     Plan:   Anes. Type:  MAC   Pre-Medication: None   Induction:  N/a   Airway: Native Airway   Access/Monitoring: PIV   Maintenance: Propofol Sedation     Postop Plan:   Postop Pain: None  Postop Sedation/Airway: Not planned  Disposition: Outpatient     PONV Management:   Adult Risk Factors:, Non-Smoker   Prevention: Ondansetron, Propofol     CONSENT: Direct conversation   Plan and risks discussed with: Patient   Blood Products: Consent Deferred (Minimal Blood Loss)                   Kendrick Valencia MD  "

## 2019-11-27 NOTE — ANESTHESIA POSTPROCEDURE EVALUATION
Anesthesia POST Procedure Evaluation    Patient: Mauro Juárez   MRN:     [id number] Gender:   male   Age:    59 year old :      1960        Preoperative Diagnosis: * No pre-op diagnosis entered *   Procedure(s):  COLONOSCOPY, WITH CO2 INSUFFLATION  ESOPHAGOGASTRODUODENOSCOPY, WITH CO2 INSUFFLATION  Esophagogastroduodenoscopy, With Balloon Dilation Of Less Than 30 Millimeters  Colonoscopy, Flexible, With Lesion Removal Using Snare  Tattooing, With Colonoscopy   Postop Comments: No value filed.       Anesthesia Type:  Not documented  MAC    Reportable Event: NO     PAIN: Uncomplicated   Sign Out status: Comfortable, Well controlled pain     PONV: No PONV   Sign Out status:  No Nausea or Vomiting     Neuro/Psych: Uneventful perioperative course   Sign Out Status: Preoperative baseline; Age appropriate mentation     Airway/Resp.: Uneventful perioperative course   Sign Out Status: Non labored breathing, age appropriate RR; Resp. Status within EXPECTED Parameters     CV: Uneventful perioperative course   Sign Out status: Appropriate BP and perfusion indices; Appropriate HR/Rhythm     Disposition:   Sign Out in:  PACU  Disposition:  Phase II; Home  Recovery Course: Uneventful  Follow-Up: Not required           Last Anesthesia Record Vitals:  CRNA VITALS  2019 1140 - 2019 1240      2019             Pulse:  76    SpO2:  94 %          Last PACU Vitals:  Vitals Value Taken Time   /81 2019 12:08 PM   Temp     Pulse 76 2019 12:05 PM   Resp 16 2019 12:08 PM   SpO2 92 % 2019 12:08 PM   Temp src Skin 2019 12:00 PM   NIBP 117/85 2019 12:05 PM   Pulse 76 2019 12:06 PM   SpO2 94 % 2019 12:06 PM   Resp     Temp 36  C (96.8  F) 2019 12:00 PM   Ht Rate 78 2019 12:01 PM   Temp 2           Electronically Signed By: Kendrick Valencia MD, 2019, 3:43 PM

## 2019-11-27 NOTE — ANESTHESIA CARE TRANSFER NOTE
Patient: Mauro Juárez    Procedure(s):  COLONOSCOPY, WITH CO2 INSUFFLATION  ESOPHAGOGASTRODUODENOSCOPY, WITH CO2 INSUFFLATION  Esophagogastroduodenoscopy, With Balloon Dilation Of Less Than 30 Millimeters  Colonoscopy, Flexible, With Lesion Removal Using Snare  Tattooing, With Colonoscopy    Diagnosis: * No pre-op diagnosis entered *  Diagnosis Additional Information: No value filed.    Anesthesia Type:   MAC     Note:  Airway :Room Air  Patient transferred to:Phase II  Comments: To Phase II. Report to RN.  VSS Resp status stable.Handoff Report: Identifed the Patient, Identified the Reponsible Provider, Reviewed the pertinent medical history, Discussed the surgical course, Reviewed Intra-OP anesthesia mangement and issues during anesthesia, Set expectations for post-procedure period and Allowed opportunity for questions and acknowledgement of understanding      Vitals: (Last set prior to Anesthesia Care Transfer)    CRNA VITALS  11/27/2019 1140 - 11/27/2019 1210      11/27/2019             Pulse:  76    SpO2:  94 %                Electronically Signed By: CARA Castaneda CRNA  November 27, 2019  12:10 PM

## 2019-11-27 NOTE — DISCHARGE INSTRUCTIONS
[facility]  Same-Day Surgery   Adult Discharge Orders & Instructions   For 24 hours after surgery  1. Get plenty of rest.  A responsible adult must stay with you for at least 24 hours after you leave the hospital.   2. Do not drive or use heavy equipment.  If you have weakness or tingling, don't drive or use heavy equipment until this feeling goes away.  3. Do not drink alcohol.  4. Avoid strenuous or risky activities.  Ask for help when climbing stairs.   5. You may feel lightheaded.  IF so, sit for a few minutes before standing.  Have someone help you get up.   6. If you have nausea (feel sick to your stomach): Drink only clear liquids such as apple juice, ginger ale, broth or 7-Up.  Rest may also help.  Be sure to drink enough fluids.  Move to a regular diet as you feel able.  7. You may have a slight fever. Call the doctor if your fever is over 100 F (37.7 C) (taken under the tongue) or lasts longer than 24 hours.  8. You may have a dry mouth, a sore throat, muscle aches or trouble sleeping.  These should go away after 24 hours.  9. Do not make important or legal decisions.   Call your doctor for any of the followin.  Signs of infection (fever, growing tenderness at the surgery site, a large amount of drainage or bleeding, severe pain, foul-smelling drainage, redness, swelling).  2. It has been over 8 to 10 hours since surgery and you are still not able to urinate (pass water).  3.  Headache for over 24 hours.

## 2019-12-02 LAB — COPATH REPORT: NORMAL

## 2019-12-18 DIAGNOSIS — G89.4 PAIN SYNDROME, CHRONIC: ICD-10-CM

## 2019-12-18 DIAGNOSIS — M54.50 BILATERAL LOW BACK PAIN WITHOUT SCIATICA, UNSPECIFIED CHRONICITY: ICD-10-CM

## 2019-12-18 RX ORDER — GABAPENTIN 100 MG/1
CAPSULE ORAL
Qty: 180 CAPSULE | Refills: 0 | Status: SHIPPED | OUTPATIENT
Start: 2019-12-18 | End: 2020-01-03

## 2019-12-18 NOTE — TELEPHONE ENCOUNTER
Received fax request from   [facility] [id number] [facility] - [place], [place] - [address]  [address]  [place] [address]  Phone: [phone] Fax: [phone]    pharmacy requesting refill(s) for Gabapentin 100mg    Last refilled on 10/31/19    Pt last seen on 11/18/19  Next appt scheduled for none    Will facilitate refill.            Jeremiah Danielson, [facility]  December 18, 2019

## 2020-01-03 DIAGNOSIS — M54.50 BILATERAL LOW BACK PAIN WITHOUT SCIATICA, UNSPECIFIED CHRONICITY: ICD-10-CM

## 2020-01-03 DIAGNOSIS — G89.4 PAIN SYNDROME, CHRONIC: ICD-10-CM

## 2020-01-03 RX ORDER — GABAPENTIN 100 MG/1
CAPSULE ORAL
Qty: 180 CAPSULE | Refills: 1 | Status: SHIPPED | OUTPATIENT
Start: 2020-01-03 | End: 2021-11-09

## 2020-01-03 NOTE — TELEPHONE ENCOUNTER
Received fax request from   [facility] [address] [facility] - [place], [place] - [address]  [address]  [place] [address]  Phone: [phone] Fax: [phone]    pharmacy requesting refill(s) for gabapentin (NEURONTIN) 100 MG capsule    Last refilled on 12/18/19    Pt last seen on 11/18/19    No future appointments scheduled at this time    Will facilitate refill.    Janet [facility]Sel[facility]  [place]

## 2020-01-27 DIAGNOSIS — N40.1 BENIGN PROSTATIC HYPERPLASIA WITH LOWER URINARY TRACT SYMPTOMS, SYMPTOM DETAILS UNSPECIFIED: ICD-10-CM

## 2020-01-27 NOTE — LETTER
January 28, 2020          Mauro Juárez,  [address] [place] [place] [address]          Dear Mauro Juárez      Your provider has sent a 30 day jj refill of tamsulosin (FLOMAX) 0.4 MG capsule. You are due for an appointment for further refills. Appointment options could include: an in person office visit, telephone visit or Evisit through APR. Please contact the clinic to schedule an appointment for further refills.       If you have received your health care elsewhere, please call the clinic so the information can be documented in your chart.    Please call [phone] or message us through your [employer] account to schedule an appointment or provide information for your chart.     Feel free to contact us if you have any questions or concerns!    I look forward to seeing you and working with you on your health care needs.     Sincerely,       Your [place] [facility] Team/[facility]

## 2020-01-28 RX ORDER — TAMSULOSIN HYDROCHLORIDE 0.4 MG/1
0.4 CAPSULE ORAL DAILY
Qty: 90 CAPSULE | Refills: 0 | Status: SHIPPED | OUTPATIENT
Start: 2020-01-28 | End: 2021-11-09

## 2020-01-28 NOTE — TELEPHONE ENCOUNTER
Medication is being filled for 1 time refill only due to:  Patient needs to be seen because need med recheck.   Please schedule  Kandace Maharaj RN

## 2020-03-11 ENCOUNTER — TELEPHONE (OUTPATIENT)
Dept: FAMILY MEDICINE | Facility: CLINIC | Age: 60
End: 2020-03-11

## 2020-03-11 NOTE — TELEPHONE ENCOUNTER
Called patient. He states that he has not yet seen anyone for his symptoms. He is going to try to get an appointment with the dentist for Friday, and then would have to see the oral surgeon next.  His mouth/teeth where suspected abscess is located is painful, tender, swollen. He declines having a fever, nausea, vomiting, chills, feeling sick.  Pt states that he has never been prescribed antibiotics in the past prior to dental visits because he has not had one since his hip replacement. He is wondering if he needs to take antibiotics prior to his dentist appointment?  Per pt, history of right hip replacement 08/03/2019.     Pt scheduled appt with Dr. Gregory for 03/13/2020 in case he is not able to get in with dentist or symptoms worsen. He will cancel if he does not need.

## 2020-03-11 NOTE — TELEPHONE ENCOUNTER
Reason for call:  Patient reporting a symptom    Symptom or request: abscessed tooth , pain and swelling; needs dental surgery to remove teeth.    Call to discuss to put on antibiotics since he had previous hip surgery.    Duration (how long have symptoms been present): n/a    Have you been treated for this before? No    Additional comments: call today.    Phone Number patient can be reached at:  Home number on file [phone] (home)    Best Time:  asap    Can we leave a detailed message on this number:  YES    Call taken on 3/11/2020 at 2:23 PM by Eloisa Tompkins

## 2020-03-13 NOTE — TELEPHONE ENCOUNTER
He would only need antibiotics prior to an extensive dental procedure if he has a heart valve issue/heart valve replacement history.  Research has shown that dental procedures are not associated with an increased risk of orthopedic hardware infection, and use of routine antibiotic prophylaxis prior to dental procedures does not alter the risk of subsequent orthopedic hardware infection.    Jn Marley MD

## 2020-03-13 NOTE — TELEPHONE ENCOUNTER
Spoke with pt. States he spoke with a nurse already and states he was given a prescription for antibiotics from a different provider. Closing encounter. No further action needed.    Connie Fajardo RN  [facility]

## 2021-01-08 ENCOUNTER — TRANSFERRED RECORDS (OUTPATIENT)
Dept: HEALTH INFORMATION MANAGEMENT | Facility: CLINIC | Age: 61
End: 2021-01-08

## 2021-01-15 ENCOUNTER — HEALTH MAINTENANCE LETTER (OUTPATIENT)
Age: 61
End: 2021-01-15

## 2021-02-05 ENCOUNTER — TRANSFERRED RECORDS (OUTPATIENT)
Dept: HEALTH INFORMATION MANAGEMENT | Facility: CLINIC | Age: 61
End: 2021-02-05

## 2021-04-23 ENCOUNTER — TRANSFERRED RECORDS (OUTPATIENT)
Dept: HEALTH INFORMATION MANAGEMENT | Facility: CLINIC | Age: 61
End: 2021-04-23

## 2021-05-14 ENCOUNTER — TRANSFERRED RECORDS (OUTPATIENT)
Dept: HEALTH INFORMATION MANAGEMENT | Facility: CLINIC | Age: 61
End: 2021-05-14

## 2021-05-21 ENCOUNTER — TRANSFERRED RECORDS (OUTPATIENT)
Dept: HEALTH INFORMATION MANAGEMENT | Facility: CLINIC | Age: 61
End: 2021-05-21

## 2021-05-25 ENCOUNTER — RECORDS - HEALTHEAST (OUTPATIENT)
Dept: ADMINISTRATIVE | Facility: CLINIC | Age: 61
End: 2021-05-25

## 2021-05-27 ENCOUNTER — TRANSFERRED RECORDS (OUTPATIENT)
Dept: HEALTH INFORMATION MANAGEMENT | Facility: CLINIC | Age: 61
End: 2021-05-27

## 2021-08-10 ENCOUNTER — OFFICE VISIT (OUTPATIENT)
Dept: FAMILY MEDICINE | Facility: CLINIC | Age: 61
End: 2021-08-10
Payer: COMMERCIAL

## 2021-08-10 VITALS
DIASTOLIC BLOOD PRESSURE: 77 MMHG | BODY MASS INDEX: 36.32 KG/M2 | SYSTOLIC BLOOD PRESSURE: 108 MMHG | WEIGHT: 277 LBS | OXYGEN SATURATION: 94 % | TEMPERATURE: 97.9 F | HEART RATE: 103 BPM

## 2021-08-10 DIAGNOSIS — R53.83 OTHER FATIGUE: Primary | ICD-10-CM

## 2021-08-10 DIAGNOSIS — R61 DIAPHORESIS: ICD-10-CM

## 2021-08-10 DIAGNOSIS — R74.8 ELEVATED LIVER ENZYMES: ICD-10-CM

## 2021-08-10 DIAGNOSIS — R82.998 DARK URINE: ICD-10-CM

## 2021-08-10 LAB
ALBUMIN SERPL-MCNC: 3.5 G/DL (ref 3.4–5)
ALBUMIN UR-MCNC: ABNORMAL MG/DL
ALP SERPL-CCNC: 161 U/L (ref 40–150)
ALT SERPL W P-5'-P-CCNC: 90 U/L (ref 0–70)
AMYLASE SERPL-CCNC: 36 U/L (ref 30–110)
ANION GAP SERPL CALCULATED.3IONS-SCNC: 4 MMOL/L (ref 3–14)
APPEARANCE UR: ABNORMAL
AST SERPL W P-5'-P-CCNC: 63 U/L (ref 0–45)
BACTERIA #/AREA URNS HPF: ABNORMAL /HPF
BILIRUB SERPL-MCNC: 1.6 MG/DL (ref 0.2–1.3)
BILIRUB UR QL STRIP: ABNORMAL
BUN SERPL-MCNC: 12 MG/DL (ref 7–30)
CALCIUM SERPL-MCNC: 8.8 MG/DL (ref 8.5–10.1)
CHLORIDE BLD-SCNC: 104 MMOL/L (ref 94–109)
CO2 SERPL-SCNC: 27 MMOL/L (ref 20–32)
COLOR UR AUTO: YELLOW
CREAT SERPL-MCNC: 1.08 MG/DL (ref 0.66–1.25)
ERYTHROCYTE [DISTWIDTH] IN BLOOD BY AUTOMATED COUNT: 14.6 % (ref 10–15)
GFR SERPL CREATININE-BSD FRML MDRD: 75 ML/MIN/1.73M2
GLUCOSE BLD-MCNC: 100 MG/DL (ref 70–99)
GLUCOSE UR STRIP-MCNC: NEGATIVE MG/DL
HBA1C MFR BLD: 5.1 % (ref 0–5.6)
HCT VFR BLD AUTO: 44.3 % (ref 40–53)
HGB BLD-MCNC: 15 G/DL (ref 13.3–17.7)
HGB UR QL STRIP: NEGATIVE
KETONES UR STRIP-MCNC: ABNORMAL MG/DL
LEUKOCYTE ESTERASE UR QL STRIP: NEGATIVE
LIPASE SERPL-CCNC: 88 U/L (ref 73–393)
MCH RBC QN AUTO: 29 PG (ref 26.5–33)
MCHC RBC AUTO-ENTMCNC: 33.9 G/DL (ref 31.5–36.5)
MCV RBC AUTO: 86 FL (ref 78–100)
MONOCYTES NFR BLD AUTO: NEGATIVE %
MUCOUS THREADS #/AREA URNS LPF: PRESENT /LPF
NITRATE UR QL: NEGATIVE
PH UR STRIP: 6 [PH] (ref 5–7)
PLATELET # BLD AUTO: 135 10E3/UL (ref 150–450)
POTASSIUM BLD-SCNC: 4.7 MMOL/L (ref 3.4–5.3)
PROT SERPL-MCNC: 7.7 G/DL (ref 6.8–8.8)
PSA SERPL-MCNC: 1.22 UG/L (ref 0–4)
RBC # BLD AUTO: 5.17 10E6/UL (ref 4.4–5.9)
RBC #/AREA URNS AUTO: ABNORMAL /HPF
SODIUM SERPL-SCNC: 135 MMOL/L (ref 133–144)
SP GR UR STRIP: 1.02 (ref 1–1.03)
SQUAMOUS #/AREA URNS AUTO: ABNORMAL /LPF
TSH SERPL DL<=0.005 MIU/L-ACNC: 1.66 MU/L (ref 0.4–4)
UROBILINOGEN UR STRIP-ACNC: 1 E.U./DL
WBC # BLD AUTO: 8.5 10E3/UL (ref 4–11)
WBC #/AREA URNS AUTO: ABNORMAL /HPF

## 2021-08-10 PROCEDURE — 82150 ASSAY OF AMYLASE: CPT | Performed by: PHYSICIAN ASSISTANT

## 2021-08-10 PROCEDURE — 81001 URINALYSIS AUTO W/SCOPE: CPT | Performed by: PHYSICIAN ASSISTANT

## 2021-08-10 PROCEDURE — 83690 ASSAY OF LIPASE: CPT | Performed by: PHYSICIAN ASSISTANT

## 2021-08-10 PROCEDURE — 86308 HETEROPHILE ANTIBODY SCREEN: CPT | Performed by: PHYSICIAN ASSISTANT

## 2021-08-10 PROCEDURE — 85027 COMPLETE CBC AUTOMATED: CPT | Performed by: PHYSICIAN ASSISTANT

## 2021-08-10 PROCEDURE — U0003 INFECTIOUS AGENT DETECTION BY NUCLEIC ACID (DNA OR RNA); SEVERE ACUTE RESPIRATORY SYNDROME CORONAVIRUS 2 (SARS-COV-2) (CORONAVIRUS DISEASE [COVID-19]), AMPLIFIED PROBE TECHNIQUE, MAKING USE OF HIGH THROUGHPUT TECHNOLOGIES AS DESCRIBED BY CMS-2020-01-R: HCPCS | Performed by: PHYSICIAN ASSISTANT

## 2021-08-10 PROCEDURE — 84443 ASSAY THYROID STIM HORMONE: CPT | Performed by: PHYSICIAN ASSISTANT

## 2021-08-10 PROCEDURE — 93000 ELECTROCARDIOGRAM COMPLETE: CPT | Performed by: PHYSICIAN ASSISTANT

## 2021-08-10 PROCEDURE — G0103 PSA SCREENING: HCPCS | Performed by: PHYSICIAN ASSISTANT

## 2021-08-10 PROCEDURE — U0005 INFEC AGEN DETEC AMPLI PROBE: HCPCS | Performed by: PHYSICIAN ASSISTANT

## 2021-08-10 PROCEDURE — 36415 COLL VENOUS BLD VENIPUNCTURE: CPT | Performed by: PHYSICIAN ASSISTANT

## 2021-08-10 PROCEDURE — 83036 HEMOGLOBIN GLYCOSYLATED A1C: CPT | Performed by: PHYSICIAN ASSISTANT

## 2021-08-10 PROCEDURE — 80053 COMPREHEN METABOLIC PANEL: CPT | Performed by: PHYSICIAN ASSISTANT

## 2021-08-10 PROCEDURE — 99214 OFFICE O/P EST MOD 30 MIN: CPT | Performed by: PHYSICIAN ASSISTANT

## 2021-08-10 ASSESSMENT — PAIN SCALES - GENERAL: PAINLEVEL: SEVERE PAIN (7)

## 2021-08-10 NOTE — PROGRESS NOTES
Assessment & Plan     Other fatigue  Dark urine  Diaphoresis  Patient diaphoretic on exam today - otherwise no physical exam findings. EKG normal. Will obtain urine and blood work to further evaluate. No further questions today.    - EKG 12-lead complete w/read - Clinics  - Symptomatic COVID-19 Virus (Coronavirus) by PCR Nasopharyngeal  - Comprehensive metabolic panel (BMP + Alb, Alk Phos, ALT, AST, Total. Bili, TP)  - CBC with platelets  - Lipase  - Amylase  - Mononucleosis screen  - TSH with free T4 reflex  - UA with Microscopic reflex to Culture - lab collect  - Hemoglobin A1c  - Urine Microscopic  - PSA, screen      Return in about 4 weeks (around 2021) for Routine preventive.    LENY Mohr [facility] [place] is a 60 year old who presents for the following health issues     HPI     Headache  Onset/Duration: 1 month  Description  Location: Entire head   Character: Dull ache  Frequency: Daily  Duration:  Lasts all day, Takes Advil with minimal relief   Wake with headaches: YES  Able to do daily activities when headache present: no   Intensity:  moderate  Progression of Symptoms:  worsening  Accompanying signs and symptoms:  Stiff neck: YES  Neck or upper back pain: YES  Sinus or URI symptoms YES- blood when blowing nose  Fever: not applicable, has not checked at home   Nausea or vomiting: YES- Nausea  Dizziness: YES  Numbness/tingling: YES- When laying wrong  Weakness: YES  Visual changes: none  History  Head trauma: no  Family history of migraines: no  Daily pain medication use: YES  Previous tests for headaches: no  Neurologist evaluation: no  Precipitating or Alleviating factors (light/sound/sleep/caffeine): N/A  Therapies tried and outcome: Advil              Outcome - not effective  Frequent/daily pain medication use: YES Kana    Son  of Wilms tumor age 4.   Taking advil daily for ongoing pain medication for leg pain.     Has had severe fatigue,  headaches for the last month. Feels dizzy and weak at times. Sweating. Not sleeping well. Nothing seems to make this better or worse. Has had dark urine - tries to eat/drink well. Feels like he is not peeing enough.  Hasn't had labs completed in almost 2 years.     Has had a chronic nonhealing leg wound for several years. Has been on various antibiotics.     Is a .     Review of Systems   Constitutional, HEENT, cardiovascular, pulmonary, gi and gu systems are negative, except as otherwise noted.      Objective    /77 (BP Location: Right arm, Patient Position: Chair, Cuff Size: Adult Regular)   Pulse 103   Temp 97.9  F (36.6  C) (Oral)   Wt 125.6 kg (277 lb)   SpO2 94%   BMI 36.32 kg/m    Body mass index is 36.32 kg/m .  Physical Exam   GENERAL: alert, diaphoretic  EYES: Eyes grossly normal to inspection, PERRL and conjunctivae and sclerae normal  HENT: ear canals and TM's normal, nose and mouth without ulcers or lesions  NECK: no adenopathy, no asymmetry, masses, or scars and thyroid normal to palpation  RESP: lungs clear to auscultation - no rales, rhonchi or wheezes  CV: regular rate and rhythm, normal S1 S2, no S3 or S4, no murmur, click or rub, no peripheral edema and peripheral pulses strong  ABDOMEN: soft, nontender, no hepatosplenomegaly, no masses and bowel sounds normal  MS: no gross musculoskeletal defects noted, no edema  SKIN: no suspicious lesions or rashes  NEURO: Normal strength and tone, mentation intact and speech normal  PSYCH: mentation appears normal, affect normal/bright    Results for orders placed or performed in visit on 08/10/21 (from the past 24 hour(s))   Symptomatic COVID-19 Virus (Coronavirus) by PCR Nasopharyngeal    Specimen: Nasopharyngeal; Swab    Narrative    The following orders were created for panel order Symptomatic COVID-19 Virus (Coronavirus) by PCR Nasopharyngeal.  Procedure                               Abnormality         Status                      ---------                               -----------         ------                     SARS-COV2 (COVID-19) Vir...[[id number]]                      In process                   Please view results for these tests on the individual orders.   CBC with platelets   Result Value Ref Range    WBC Count 8.5 4.0 - 11.0 10e3/uL    RBC Count 5.17 4.40 - 5.90 10e6/uL    Hemoglobin 15.0 13.3 - 17.7 g/dL    Hematocrit 44.3 40.0 - 53.0 %    MCV 86 78 - 100 fL    MCH 29.0 26.5 - 33.0 pg    MCHC 33.9 31.5 - 36.5 g/dL    RDW 14.6 10.0 - 15.0 %    Platelet Count 135 (L) 150 - 450 10e3/uL   Mononucleosis screen   Result Value Ref Range    Mononucleosis Screen Negative Negative   Hemoglobin A1c   Result Value Ref Range    Hemoglobin A1C 5.1 0.0 - 5.6 %   UA with Microscopic reflex to Culture - lab collect    Specimen: Urine, Midstream   Result Value Ref Range    Color Urine Yellow Colorless, Straw, Light Yellow, Yellow    Appearance Urine Slightly Cloudy (A) Clear    Glucose Urine Negative Negative mg/dL    Bilirubin Urine Small (A) Negative    Ketones Urine Trace (A) Negative mg/dL    Specific Gravity Urine 1.020 1.003 - 1.035    Blood Urine Negative Negative    pH Urine 6.0 5.0 - 7.0    Protein Albumin Urine Trace (A) Negative mg/dL    Urobilinogen Urine 1.0 0.2, 1.0 E.U./dL    Nitrite Urine Negative Negative    Leukocyte Esterase Urine Negative Negative   Urine Microscopic   Result Value Ref Range    Bacteria Urine Few (A) None Seen /HPF    RBC Urine None Seen 0-2 /HPF /HPF    WBC Urine 0-5 0-5 /HPF /HPF    Squamous Epithelials Urine Few (A) None Seen /LPF    Mucus Urine Present (A) None Seen /LPF    Narrative    Urine Culture not indicated

## 2021-08-11 ENCOUNTER — TELEPHONE (OUTPATIENT)
Dept: FAMILY MEDICINE | Facility: CLINIC | Age: 61
End: 2021-08-11

## 2021-08-11 LAB — SARS-COV-2 RNA RESP QL NAA+PROBE: NEGATIVE

## 2021-08-11 NOTE — TELEPHONE ENCOUNTER
Patient's emergency contact Jurgen Barr called to find out what patient can take for body aches and pain. Jurgen reports that patient has elevated liver enzymes and she read that tylenol and ibuprofen are discouraged. Jurgen says patient usually takes ibuprofen. RN advised caller to have patient avoid tylenol and opt for ibuprofen instead for now. RN let caller know that she would send message to Heidi Levin to see if she had any suggestions.  Please call or send [employer] message with any warnings or suggestions. Phone number [phone] okay to leave detailed voicemail.  Nohelia Chandler RN on 8/11/2021 at 5:57 PM

## 2021-08-12 ENCOUNTER — HOSPITAL ENCOUNTER (OUTPATIENT)
Dept: CT IMAGING | Facility: HOSPITAL | Age: 61
Discharge: HOME | End: 2021-08-12
Attending: PHYSICIAN ASSISTANT | Admitting: PHYSICIAN ASSISTANT
Payer: COMMERCIAL

## 2021-08-12 DIAGNOSIS — R82.998 DARK URINE: ICD-10-CM

## 2021-08-12 DIAGNOSIS — R53.83 OTHER FATIGUE: ICD-10-CM

## 2021-08-12 DIAGNOSIS — R61 DIAPHORESIS: ICD-10-CM

## 2021-08-12 DIAGNOSIS — R74.8 ELEVATED LIVER ENZYMES: ICD-10-CM

## 2021-08-12 PROCEDURE — 74177 CT ABD & PELVIS W/CONTRAST: CPT

## 2021-08-12 PROCEDURE — 250N000011 HC RX IP 250 OP 636: Performed by: PHYSICIAN ASSISTANT

## 2021-08-12 RX ORDER — IOPAMIDOL 755 MG/ML
100 INJECTION, SOLUTION INTRAVASCULAR ONCE
Status: COMPLETED | OUTPATIENT
Start: 2021-08-12 | End: 2021-08-12

## 2021-08-12 RX ADMIN — IOPAMIDOL 100 ML: 755 INJECTION, SOLUTION INTRAVENOUS at 19:13

## 2021-08-12 NOTE — TELEPHONE ENCOUNTER
Left detailed voicemail for Jurgen Momo at [phone]. Message from Heidi Levin delivered verbally as written. Encouraged Jurgen to call back to schedule lab appointment and if they have any further questions or concerns.  Nohelia Chandler RN on 8/12/2021 at 11:29 AM

## 2021-08-12 NOTE — TELEPHONE ENCOUNTER
Yes, ibuprofen for any discomfort that he is having.   Avoid tylenol. Also no alcohol.     The additional labs I ordered couldn't be processed on the blood already drawn - please have him come in for a lab appointment.     Heidi Levin PA-C

## 2021-08-13 ENCOUNTER — TRANSFERRED RECORDS (OUTPATIENT)
Dept: HEALTH INFORMATION MANAGEMENT | Facility: CLINIC | Age: 61
End: 2021-08-13

## 2021-08-13 ENCOUNTER — LAB (OUTPATIENT)
Dept: LAB | Facility: CLINIC | Age: 61
End: 2021-08-13
Payer: COMMERCIAL

## 2021-08-13 DIAGNOSIS — R74.8 ELEVATED LIVER ENZYMES: ICD-10-CM

## 2021-08-13 DIAGNOSIS — R53.83 OTHER FATIGUE: ICD-10-CM

## 2021-08-13 DIAGNOSIS — R53.83 OTHER FATIGUE: Primary | ICD-10-CM

## 2021-08-13 LAB
BASOPHILS # BLD AUTO: 0.1 10E3/UL (ref 0–0.2)
BASOPHILS NFR BLD AUTO: 1 %
EOSINOPHIL # BLD AUTO: 0 10E3/UL (ref 0–0.7)
EOSINOPHIL NFR BLD AUTO: 0 %
ERYTHROCYTE [DISTWIDTH] IN BLOOD BY AUTOMATED COUNT: 14.2 % (ref 10–15)
GGT SERPL-CCNC: 190 U/L (ref 0–75)
HCT VFR BLD AUTO: 44.7 % (ref 40–53)
HGB BLD-MCNC: 15.2 G/DL (ref 13.3–17.7)
IMM GRANULOCYTES # BLD: 0.1 10E3/UL
IMM GRANULOCYTES NFR BLD: 1 %
LYMPHOCYTES # BLD AUTO: 3.6 10E3/UL (ref 0.8–5.3)
LYMPHOCYTES NFR BLD AUTO: 44 %
MCH RBC QN AUTO: 29.1 PG (ref 26.5–33)
MCHC RBC AUTO-ENTMCNC: 34 G/DL (ref 31.5–36.5)
MCV RBC AUTO: 86 FL (ref 78–100)
MONOCYTES # BLD AUTO: 0.6 10E3/UL (ref 0–1.3)
MONOCYTES NFR BLD AUTO: 8 %
NEUTROPHILS # BLD AUTO: 3.8 10E3/UL (ref 1.6–8.3)
NEUTROPHILS NFR BLD AUTO: 46 %
NRBC # BLD AUTO: 0 10E3/UL
NRBC BLD AUTO-RTO: 0 /100
NT-PROBNP SERPL-MCNC: 336 PG/ML (ref 0–125)
PLAT MORPH BLD: ABNORMAL
PLATELET # BLD AUTO: 155 10E3/UL (ref 150–450)
RBC # BLD AUTO: 5.22 10E6/UL (ref 4.4–5.9)
RBC MORPH BLD: ABNORMAL
VARIANT LYMPHS BLD QL SMEAR: PRESENT
WBC # BLD AUTO: 8.1 10E3/UL (ref 4–11)

## 2021-08-13 PROCEDURE — 99000 SPECIMEN HANDLING OFFICE-LAB: CPT | Performed by: PHYSICIAN ASSISTANT

## 2021-08-13 PROCEDURE — 82977 ASSAY OF GGT: CPT

## 2021-08-13 PROCEDURE — 82373 ASSAY C-D TRANSFER MEASURE: CPT | Mod: 90 | Performed by: PHYSICIAN ASSISTANT

## 2021-08-13 PROCEDURE — 87340 HEPATITIS B SURFACE AG IA: CPT | Mod: 90 | Performed by: PHYSICIAN ASSISTANT

## 2021-08-13 PROCEDURE — 86803 HEPATITIS C AB TEST: CPT | Mod: 90

## 2021-08-13 PROCEDURE — 83880 ASSAY OF NATRIURETIC PEPTIDE: CPT

## 2021-08-13 PROCEDURE — 86706 HEP B SURFACE ANTIBODY: CPT | Mod: 90 | Performed by: PHYSICIAN ASSISTANT

## 2021-08-13 PROCEDURE — 36415 COLL VENOUS BLD VENIPUNCTURE: CPT | Performed by: PHYSICIAN ASSISTANT

## 2021-08-13 PROCEDURE — 85025 COMPLETE CBC W/AUTO DIFF WBC: CPT

## 2021-08-13 PROCEDURE — 86704 HEP B CORE ANTIBODY TOTAL: CPT | Mod: 90 | Performed by: PHYSICIAN ASSISTANT

## 2021-08-16 ENCOUNTER — TELEPHONE (OUTPATIENT)
Dept: FAMILY MEDICINE | Facility: CLINIC | Age: 61
End: 2021-08-16

## 2021-08-16 DIAGNOSIS — R79.89 ELEVATED BRAIN NATRIURETIC PEPTIDE (BNP) LEVEL: Primary | ICD-10-CM

## 2021-08-16 DIAGNOSIS — R42 DIZZINESS: ICD-10-CM

## 2021-08-16 LAB
HBV CORE AB SERPL QL IA: NONREACTIVE
HBV SURFACE AB SERPL IA-ACNC: 0.61 M[IU]/ML
HBV SURFACE AG SERPL QL IA: NONREACTIVE
HCV AB SERPL QL IA: NONREACTIVE

## 2021-08-16 NOTE — TELEPHONE ENCOUNTER
I called and spoke with Mauro and Jurgen.     Labs show elevated liver enzymes, elevated GGT, biliruinuria. Normal lipase, amylase. Patient denies alcohol use, tylenol use. Negative hepatitis. Negative mono. Negative covid.   Differentials to still include hepatobiliary (could do HIDA scan), alcoholic cirrhosis although he reports no alcohol in 2 years (carbohydrate deficient transferrin still pending) and CT was negative. CT did show adenopathy and splenomegaly (suggest repeat CT in 3-6 months - but this could be a result from ongoing leg wound).     He has been on and off antibiotics for a number of months. His leg wound care team at [facility] performed a culture of his would - which he received the results while on the phone with me. They found 2-3 different bacteria (difficult to hear as they were trying to hold one cell phone up to another cell phone). He will be starting antibiotics including augmentin and levaquin for treatment of this infection.    BNP was elevated. Suggest a follow up echocardiogram to evaluate.     Request he follow up in clinic with in 7-10 day for recheck. Patient reports today he is planning to return to work.     Heidi Levin PA-C

## 2021-08-16 NOTE — TELEPHONE ENCOUNTER
Jurgen, patient's partner called the clinic. Consent to communicate on file.    She is inquiring about the results on lab completed 8/13/21 and how to proceed.  Patient continues to experience fatigue and would like provider's recommendations on plan of care.    Please call Jurgen at [phone] with the provider's recommendations.

## 2021-08-16 NOTE — TELEPHONE ENCOUNTER
Called and spoke with graham.    Informed her of the provider's recommendations.  She reports that patient continues to experience nausea with no emesis, sweating especially with eating meals or drinking fluids, constant headaches, increased fatigue and generalized weakness and body aches.    Patient;s spouse is inquiring about alternative treatment while waiting for the other results.    Reviewed interventions for hydration and pain management with over the counter pain medications (Ibuprofen).    Patient's spouse verbalized understanding and has no further questions or concerns at this time.

## 2021-08-18 ENCOUNTER — TELEPHONE (OUTPATIENT)
Dept: FAMILY MEDICINE | Facility: CLINIC | Age: 61
End: 2021-08-18

## 2021-08-18 LAB
CDT DISIALO/CDT TETRASIALO SERPL: 0.03 (ref 0–0.1)
CLINICAL BIOCHEMIST REVIEW: NORMAL

## 2021-08-18 NOTE — TELEPHONE ENCOUNTER
Patient's wife calling requesting patient be referred to a specialist. His symptoms are the same, no improvement. Please advise.     OK to leave message.     Priti Alcantara RN

## 2021-08-19 ENCOUNTER — ANCILLARY PROCEDURE (OUTPATIENT)
Dept: CARDIOLOGY | Facility: CLINIC | Age: 61
End: 2021-08-19
Attending: PHYSICIAN ASSISTANT
Payer: COMMERCIAL

## 2021-08-19 ENCOUNTER — TELEPHONE (OUTPATIENT)
Dept: FAMILY MEDICINE | Facility: CLINIC | Age: 61
End: 2021-08-19

## 2021-08-19 DIAGNOSIS — R42 DIZZINESS: ICD-10-CM

## 2021-08-19 DIAGNOSIS — R79.89 ELEVATED BRAIN NATRIURETIC PEPTIDE (BNP) LEVEL: ICD-10-CM

## 2021-08-19 LAB — LVEF ECHO: NORMAL

## 2021-08-19 PROCEDURE — 93306 TTE W/DOPPLER COMPLETE: CPT | Performed by: INTERNAL MEDICINE

## 2021-08-19 PROCEDURE — 99207 PR STATISTIC IV PUSH SINGLE INITIAL SUBSTANCE: CPT | Performed by: INTERNAL MEDICINE

## 2021-08-19 RX ADMIN — Medication 3 ML: at 15:23

## 2021-08-19 NOTE — TELEPHONE ENCOUNTER
Per the telephone encounter with Heidi Ollie patient was to follow up in clinic in 1 week. He should be scheduled next week to review his echo and make a further plan. Heidi is out of clinic the rest of this week.   Cally Stewart PA-C

## 2021-08-19 NOTE — TELEPHONE ENCOUNTER
Patient's significant other, Jurgen walked into clinic to discuss patient's medical condition. She states that patient has been sick on and off for about 5 weeks now. Patient saw Heidi Levin on 08/10/2021 d/t Dr. Marley being out of office. She states that she does not feel there are any answers or direction for next steps. Writer did relay recent result notes and reference the TE from 08/16/2021 with Heidi Levin. RN also stated that next steps would be a f/u appointment to discuss echo results (scheduled with Dr. Marley on Monday, per patient preference), however Jurgen was wondering about seeing a specialist and also inquired about possible workup for infection from tick. Patient's symptoms include headaches, nausea, generalized aching, muscle aches, sweating, fatigue and dark urine despite staying hydrated. Of note, he has been taking Ibuprofen for LLE wound (12 /day) and is now on antibiotics for this- both Augmentin and Levaquin. He has been tested for COVID-19 (negative). She inquired about possible second opinions at alternate facilities and writer noted that this is always an option, but to keep appointment scheduled for Monday and go from there. She verbalized understanding and stated that she would follow through with this.     RAISSA Yap RN  [facility], [place]

## 2021-08-19 NOTE — TELEPHONE ENCOUNTER
Called patient to schedule appointment with Heidi Levin per message that was sent. Patient did not want to see Heidi. Dr. Gregory gave the permission to use Virtual appointment for patient. Patient then saying he doesn't understand why he has to wait a week to be seen. He is trying to get back to work and doesn't think he is able to make the appointment at this time. Patient hung up     Armida-

## 2021-08-20 DIAGNOSIS — R93.1 ABNORMAL ECHOCARDIOGRAM: Primary | ICD-10-CM

## 2021-08-20 NOTE — RESULT ENCOUNTER NOTE
Mauro,     I am covering for Heidi Levin PA-C who is out of the clinic this week. Your heart ultrasound shows some thickening of the walls of the heart. The recommendation is that you get an MRI of the heart and see the cardiologists. I have placed these ordered. You can call [phone] to schedule the MRI. You should keep your appointment on Monday to review your symptoms and determine if additional steps are needed.   Cally Stewart PA-C

## 2021-08-23 ENCOUNTER — OFFICE VISIT (OUTPATIENT)
Dept: FAMILY MEDICINE | Facility: CLINIC | Age: 61
End: 2021-08-23
Payer: COMMERCIAL

## 2021-08-23 VITALS
WEIGHT: 276 LBS | TEMPERATURE: 97.1 F | DIASTOLIC BLOOD PRESSURE: 78 MMHG | HEART RATE: 78 BPM | OXYGEN SATURATION: 96 % | SYSTOLIC BLOOD PRESSURE: 114 MMHG | HEIGHT: 74 IN | BODY MASS INDEX: 35.42 KG/M2

## 2021-08-23 DIAGNOSIS — S81.002A OPEN WOUND OF LEFT KNEE, LEG, AND ANKLE, INITIAL ENCOUNTER: ICD-10-CM

## 2021-08-23 DIAGNOSIS — S91.002A OPEN WOUND OF LEFT KNEE, LEG, AND ANKLE, INITIAL ENCOUNTER: ICD-10-CM

## 2021-08-23 DIAGNOSIS — I42.1 HOCM (HYPERTROPHIC OBSTRUCTIVE CARDIOMYOPATHY) (H): ICD-10-CM

## 2021-08-23 DIAGNOSIS — R50.9 CHRONIC FEVER: Primary | ICD-10-CM

## 2021-08-23 DIAGNOSIS — Q24.8 LEFT VENTRICULAR OUTFLOW OBSTRUCTION: ICD-10-CM

## 2021-08-23 DIAGNOSIS — S81.802A OPEN WOUND OF LEFT KNEE, LEG, AND ANKLE, INITIAL ENCOUNTER: ICD-10-CM

## 2021-08-23 LAB
ALBUMIN SERPL-MCNC: 3.3 G/DL (ref 3.4–5)
ALP SERPL-CCNC: 159 U/L (ref 40–150)
ALT SERPL W P-5'-P-CCNC: 80 U/L (ref 0–70)
ANION GAP SERPL CALCULATED.3IONS-SCNC: 7 MMOL/L (ref 3–14)
AST SERPL W P-5'-P-CCNC: 51 U/L (ref 0–45)
BILIRUB DIRECT SERPL-MCNC: 0.2 MG/DL (ref 0–0.2)
BILIRUB SERPL-MCNC: 0.9 MG/DL (ref 0.2–1.3)
BUN SERPL-MCNC: 15 MG/DL (ref 7–30)
CALCIUM SERPL-MCNC: 8.7 MG/DL (ref 8.5–10.1)
CHLORIDE BLD-SCNC: 108 MMOL/L (ref 94–109)
CO2 SERPL-SCNC: 25 MMOL/L (ref 20–32)
CREAT SERPL-MCNC: 0.87 MG/DL (ref 0.66–1.25)
CRP SERPL-MCNC: 15.1 MG/L (ref 0–8)
ERYTHROCYTE [SEDIMENTATION RATE] IN BLOOD BY WESTERGREN METHOD: 34 MM/HR (ref 0–20)
GFR SERPL CREATININE-BSD FRML MDRD: >90 ML/MIN/1.73M2
GLUCOSE BLD-MCNC: 91 MG/DL (ref 70–99)
POTASSIUM BLD-SCNC: 4.4 MMOL/L (ref 3.4–5.3)
PROT SERPL-MCNC: 8 G/DL (ref 6.8–8.8)
RETICS # AUTO: 0.19 10E6/UL (ref 0.03–0.1)
RETICS/RBC NFR AUTO: 3.9 % (ref 0.5–2)
SODIUM SERPL-SCNC: 140 MMOL/L (ref 133–144)

## 2021-08-23 PROCEDURE — 85060 BLOOD SMEAR INTERPRETATION: CPT | Performed by: PATHOLOGY

## 2021-08-23 PROCEDURE — 86225 DNA ANTIBODY NATIVE: CPT | Performed by: FAMILY MEDICINE

## 2021-08-23 PROCEDURE — 86617 LYME DISEASE ANTIBODY: CPT | Mod: 90 | Performed by: FAMILY MEDICINE

## 2021-08-23 PROCEDURE — 99000 SPECIMEN HANDLING OFFICE-LAB: CPT | Performed by: FAMILY MEDICINE

## 2021-08-23 PROCEDURE — 80053 COMPREHEN METABOLIC PANEL: CPT | Performed by: FAMILY MEDICINE

## 2021-08-23 PROCEDURE — 86753 PROTOZOA ANTIBODY NOS: CPT | Mod: 90 | Performed by: FAMILY MEDICINE

## 2021-08-23 PROCEDURE — 83516 IMMUNOASSAY NONANTIBODY: CPT | Performed by: FAMILY MEDICINE

## 2021-08-23 PROCEDURE — 85652 RBC SED RATE AUTOMATED: CPT | Performed by: FAMILY MEDICINE

## 2021-08-23 PROCEDURE — 85025 COMPLETE CBC W/AUTO DIFF WBC: CPT | Performed by: FAMILY MEDICINE

## 2021-08-23 PROCEDURE — 82248 BILIRUBIN DIRECT: CPT | Performed by: FAMILY MEDICINE

## 2021-08-23 PROCEDURE — 86666 EHRLICHIA ANTIBODY: CPT | Mod: 90 | Performed by: FAMILY MEDICINE

## 2021-08-23 PROCEDURE — 99215 OFFICE O/P EST HI 40 MIN: CPT | Performed by: FAMILY MEDICINE

## 2021-08-23 PROCEDURE — 85045 AUTOMATED RETICULOCYTE COUNT: CPT | Performed by: FAMILY MEDICINE

## 2021-08-23 PROCEDURE — 86140 C-REACTIVE PROTEIN: CPT | Performed by: FAMILY MEDICINE

## 2021-08-23 PROCEDURE — 86618 LYME DISEASE ANTIBODY: CPT | Performed by: FAMILY MEDICINE

## 2021-08-23 PROCEDURE — 87040 BLOOD CULTURE FOR BACTERIA: CPT | Performed by: FAMILY MEDICINE

## 2021-08-23 PROCEDURE — 86038 ANTINUCLEAR ANTIBODIES: CPT | Performed by: FAMILY MEDICINE

## 2021-08-23 PROCEDURE — 86431 RHEUMATOID FACTOR QUANT: CPT | Performed by: FAMILY MEDICINE

## 2021-08-23 PROCEDURE — 36415 COLL VENOUS BLD VENIPUNCTURE: CPT | Performed by: FAMILY MEDICINE

## 2021-08-23 PROCEDURE — 86039 ANTINUCLEAR ANTIBODIES (ANA): CPT | Performed by: FAMILY MEDICINE

## 2021-08-23 RX ORDER — LEVOFLOXACIN 750 MG/1
750 TABLET, FILM COATED ORAL DAILY
COMMUNITY
Start: 2021-08-16 | End: 2021-11-09

## 2021-08-23 ASSESSMENT — MIFFLIN-ST. JEOR: SCORE: 2128.81

## 2021-08-23 NOTE — PROGRESS NOTES
Assessment & Plan       ICD-10-CM    1. Chronic fever  R50.9 Infectious Disease Referral     Lyme Disease Stuart with reflex to WB Serum     Anaplasma phagocytoph antibody IgG IgM     Babesia antibody IgG IgM     ESR: Erythrocyte sedimentation rate     CRP, inflammation     Anti Nuclear Stuart IgG by IFA with Reflex     Rheumatoid factor     Blood Culture Peripheral Blood     Lab Blood Morphology Pathologist Review     DNA double stranded antibodies     Comprehensive metabolic panel (BMP + Alb, Alk Phos, ALT, AST, Total. Bili, TP)     Bilirubin direct     Adult Cardiology Eval Referral     Lyme Disease Stuart with reflex to WB Serum     Anaplasma phagocytoph antibody IgG IgM     Babesia antibody IgG IgM     ESR: Erythrocyte sedimentation rate     CRP, inflammation     Anti Nuclear Stuart IgG by IFA with Reflex     Rheumatoid factor     Blood Culture Peripheral Blood     Lab Blood Morphology Pathologist Review     DNA double stranded antibodies     Comprehensive metabolic panel (BMP + Alb, Alk Phos, ALT, AST, Total. Bili, TP)     Bilirubin direct   2. Open wound of left knee, leg, and ankle, initial encounter  S81.002A Infectious Disease Referral    S81.802A ESR: Erythrocyte sedimentation rate    S91.002A CRP, inflammation     Anti Nuclear Stuart IgG by IFA with Reflex     Rheumatoid factor     Blood Culture Peripheral Blood     ESR: Erythrocyte sedimentation rate     CRP, inflammation     Anti Nuclear Stuart IgG by IFA with Reflex     Rheumatoid factor     Blood Culture Peripheral Blood   3. HOCM (hypertrophic obstructive cardiomyopathy) (H)  I42.1 Tissue transglutaminase stuart IgA and IgG     Adult Cardiology Eval Referral     Tissue transglutaminase stuart IgA and IgG   4. Left ventricular outflow obstruction  Q24.8      Unknown cause for hepatitis/elevated bili/splenomegaly however suspect systemic involvement of chronic leg wound infection.  Tick bourne illness workup per request as patient does have several symptoms consistent  with this including a new heart issue.  Referrals to ID and cardiology ordered.    Review of external notes as documented elsewhere in note  Review of the result(s) of each unique test - wound cultures, echocardiogram   Obtain records from vascular surgery    A total of 60 minutes spent face-to-face with greater than 50% of the time spent in counseling and coordinating cares of the issues above     No follow-ups on file.    Jn Marely MD  [facility] KYRA Wade is a 60 year old who presents for the following health issues     HPI     Chief Complaint   Patient presents with     Results     Echo     Patient presents to discuss echo as well as workup for chronic systemic symptoms/signs.  He has a chronic left lower leg wound that doesn't heal.  He has had several forms of treatment for this over the years, including skin grafting.  His wound has been present for 40 years per wife.  Recent wound infection treated with antibiotics.  Culture done 10 days ago grew several type of bacteria including proteus, enterococcus, corynebacteria.  Previous culture done earlier this year grew campylobacter.  Of note, he has experienced sweating, muscle aches, joint pain, fatigue for the past 6 weeks or so.  Lab workup showed mild hepatitis, with elevations in GGT, alk phos, and bilirubin.  CT abdomen showed gatrohepatic/mesenteric lymphadenopathy and splenomegaly.  Patient had a cardiac echo done 2/2 elevated BNP, which showed mild HOCM with mitral valve systolic anterior motion (ZOHRA) causing severe left ventricular outflow obstruction (LVOT).  Patient has yet to go for cardiac MRI or make appointment to see cardiology.  Patient's systemic signs have improved since starting augmentin and levaquin.  Wound looks a little better.  Patient requests repeat blood tests including workup for tick-bourne illness.        Review of Systems   Constitutional, HEENT, cardiovascular, pulmonary, gi and gu  "systems are negative, except as otherwise noted.      Objective    /78   Pulse 78   Temp 97.1  F (36.2  C) (Temporal)   Ht 1.867 m (6' 1.5\")   Wt 125.2 kg (276 lb)   SpO2 96%   BMI 35.92 kg/m    Body mass index is 35.92 kg/m .  Physical Exam   GENERAL: healthy, alert and no distress  EYES: Eyes grossly normal to inspection, PERRL and conjunctivae and sclerae normal  NECK: no adenopathy, no asymmetry, masses, or scars and thyroid normal to palpation  PSYCH: mentation appears normal, affect normal/bright              "

## 2021-08-24 ENCOUNTER — TELEPHONE (OUTPATIENT)
Dept: CARDIOLOGY | Facility: CLINIC | Age: 61
End: 2021-08-24

## 2021-08-24 LAB
B BURGDOR IGG+IGM SER QL: 1.32
BASOPHILS # BLD AUTO: 0.1 10E3/UL (ref 0–0.2)
BASOPHILS NFR BLD AUTO: 1 %
DSDNA AB SER-ACNC: 1.9 IU/ML
EOSINOPHIL # BLD AUTO: 0.1 10E3/UL (ref 0–0.7)
EOSINOPHIL NFR BLD AUTO: 1 %
ERYTHROCYTE [DISTWIDTH] IN BLOOD BY AUTOMATED COUNT: 15.1 % (ref 10–15)
HCT VFR BLD AUTO: 43.4 % (ref 40–53)
HGB BLD-MCNC: 14.2 G/DL (ref 13.3–17.7)
IMM GRANULOCYTES # BLD: 0 10E3/UL
IMM GRANULOCYTES NFR BLD: 1 %
LYMPHOCYTES # BLD AUTO: 4.9 10E3/UL (ref 0.8–5.3)
LYMPHOCYTES NFR BLD AUTO: 54 %
MCH RBC QN AUTO: 29 PG (ref 26.5–33)
MCHC RBC AUTO-ENTMCNC: 32.7 G/DL (ref 31.5–36.5)
MCV RBC AUTO: 89 FL (ref 78–100)
MONOCYTES # BLD AUTO: 0.7 10E3/UL (ref 0–1.3)
MONOCYTES NFR BLD AUTO: 8 %
NEUTROPHILS # BLD AUTO: 3 10E3/UL (ref 1.6–8.3)
NEUTROPHILS NFR BLD AUTO: 35 %
NRBC # BLD AUTO: 0 10E3/UL
NRBC BLD AUTO-RTO: 0 /100
PATH REPORT.COMMENTS IMP SPEC: NORMAL
PATH REPORT.COMMENTS IMP SPEC: NORMAL
PATH REPORT.FINAL DX SPEC: NORMAL
PATH REPORT.MICROSCOPIC SPEC OTHER STN: NORMAL
PATH REPORT.MICROSCOPIC SPEC OTHER STN: NORMAL
PLATELET # BLD AUTO: 207 10E3/UL (ref 150–450)
RBC # BLD AUTO: 4.89 10E6/UL (ref 4.4–5.9)
RHEUMATOID FACT SER NEPH-ACNC: 26 IU/ML
TTG IGA SER-ACNC: 0.6 U/ML
TTG IGG SER-ACNC: 1.1 U/ML
WBC # BLD AUTO: 8.7 10E3/UL (ref 4–11)

## 2021-08-24 NOTE — TELEPHONE ENCOUNTER
Date: 8/24/2021    Time of Call: 12:29 PM     Diagnosis:  HCM     [ TORB ] Ordering provider: Dr. Arenas  Order: cardiac MRI prior to appt. With Dr. Arenas     Order received by: ELIUD Maurice     Follow-up/additional notes: Sent to scheduling.

## 2021-08-24 NOTE — TELEPHONE ENCOUNTER
[facility] Patient Intake    Referred by: from his PCP   1.  Patient's cardiac history:  Has been more fatigue in the last 3 years, has been having more SOB, hard to take the heat, they were having testing for something else and then was referred to us.  2.  Previous cardiac procedures (heart catherizations, surgeries): None  3.  Patient's previous cardiologist and when last visit was: None  4.  Recent testing (Echo, MRI, CT, Stress tests): [facility]  5.  Any present concerns: None  6.  Closet location for patient to be seen ([facility]): [facility]  7.  Any recent testing at the [facility]: None  8.  Patient's E mail or Fax number to send IN[facility]: [email or web address]  9.  Update chart with patient's PCP: Bri  10. Any genetic testing done within the family: None  11. Reason for referral: Abnormal echocardiogram.   [phone]- call JESSIE to schedule.  RECORDS REQUESTED FROM:         Clinic name Comments Records Status Imaging Status                                                          RECORDS STATUS: Internal

## 2021-08-25 LAB
A PHAGOCYTOPH IGG TITR SER IF: NORMAL {TITER}
A PHAGOCYTOPH IGM TITR SER IF: NORMAL {TITER}
ANA PAT SER IF-IMP: ABNORMAL
ANA SER QL IF: POSITIVE
ANA TITR SER IF: ABNORMAL {TITER}
B MICROTI IGG TITR SER: NORMAL {TITER}
B MICROTI IGM TITR SER: NORMAL {TITER}

## 2021-08-26 LAB
B BURGDOR IGG SER QL IB: NEGATIVE
B BURGDOR IGM SER QL IB: POSITIVE

## 2021-08-28 LAB — BACTERIA BLD CULT: NO GROWTH

## 2021-08-31 ENCOUNTER — TELEPHONE (OUTPATIENT)
Dept: FAMILY MEDICINE | Facility: CLINIC | Age: 61
End: 2021-08-31

## 2021-08-31 NOTE — TELEPHONE ENCOUNTER
Patient's wife, Jurgen, received letter for patient to make an appointment with a cardiologist at the [facility].  However, she is not sure if this is the correct cardiologist.  Instructed wife to call [facility] to clarify.    Consent to communicate on file.    Zen Shaikh RN

## 2021-09-09 ENCOUNTER — OFFICE VISIT (OUTPATIENT)
Dept: FAMILY MEDICINE | Facility: CLINIC | Age: 61
End: 2021-09-09
Payer: COMMERCIAL

## 2021-09-09 VITALS
SYSTOLIC BLOOD PRESSURE: 124 MMHG | TEMPERATURE: 97.6 F | WEIGHT: 278.4 LBS | DIASTOLIC BLOOD PRESSURE: 86 MMHG | OXYGEN SATURATION: 98 % | BODY MASS INDEX: 36.23 KG/M2 | HEART RATE: 75 BPM

## 2021-09-09 DIAGNOSIS — M79.651 PAIN OF RIGHT THIGH: ICD-10-CM

## 2021-09-09 DIAGNOSIS — A69.20 LYME DISEASE: Primary | ICD-10-CM

## 2021-09-09 DIAGNOSIS — Z98.890: ICD-10-CM

## 2021-09-09 DIAGNOSIS — Z11.4 SCREENING FOR HIV (HUMAN IMMUNODEFICIENCY VIRUS): ICD-10-CM

## 2021-09-09 PROCEDURE — 93000 ELECTROCARDIOGRAM COMPLETE: CPT | Performed by: FAMILY MEDICINE

## 2021-09-09 PROCEDURE — 99214 OFFICE O/P EST MOD 30 MIN: CPT | Performed by: FAMILY MEDICINE

## 2021-09-09 RX ORDER — DOXYCYCLINE 100 MG/1
100 CAPSULE ORAL 2 TIMES DAILY
Qty: 28 CAPSULE | Refills: 0 | Status: SHIPPED | OUTPATIENT
Start: 2021-09-09 | End: 2021-09-23

## 2021-09-09 NOTE — PROGRESS NOTES
Assessment & Plan       ICD-10-CM    1. Lyme disease  A69.20 REVIEW OF HEALTH MAINTENANCE PROTOCOL ORDERS     EKG 12-lead complete w/read - Clinics     doxycycline hyclate (VIBRAMYCIN) 100 MG capsule   2. Pain of right thigh  M79.651 Orthopedic  Referral   3. Presence of internal fixation yanique in left upper extremity  Z98.890 Orthopedic  Referral   4. Screening for HIV (human immunodeficiency virus)  Z11.4          Review of external notes as documented elsewhere in note        Return in about 2 weeks (around 9/23/2021) for With Specialist.    Jn Marley MD  [facility] KYRA Wade is a 60 year old who presents for the following health issues     HPI     Chief Complaint   Patient presents with     Results     labs and echo     Go over results from lab and echo    Lyme IgM positive  Symptoms improved with short course of antibiotics x 10 days, initially prescribed for recurrent cellulitis of chronic leg wound.  No target lesion present however had systemic flu-like symptoms, persistent fever.  No tick bite noted however spent time in endemic area.    Right leg pain  History of right hip replacement  Pain is in the proximal quadricep area  Worse when walking/ standing up from seated position    Review of Systems   Constitutional, HEENT, cardiovascular, pulmonary, gi and gu systems are negative, except as otherwise noted.      Objective    /86   Pulse 75   Temp 97.6  F (36.4  C) (Oral)   Wt 126.3 kg (278 lb 6.4 oz)   SpO2 98%   BMI 36.23 kg/m    Body mass index is 36.23 kg/m .  Physical Exam   GENERAL: healthy, alert and no distress  EYES: Eyes grossly normal to inspection, PERRL and conjunctivae and sclerae normal  NECK: no adenopathy, no asymmetry, masses, or scars and thyroid normal to palpation  SKIN: no suspicious lesions or rashes  PSYCH: mentation appears normal, affect normal/bright

## 2021-09-14 NOTE — TELEPHONE ENCOUNTER
RECORDS RECEIVED FROM: Internal   DATE RECEIVED: 10.08.2021   NOTES (Gather within 2 years) STATUS DETAILS   OFFICE NOTE from referring provider   Internal 08.23. Jn Gregory MD   OFFICE NOTE from other specialist N/A    DISCHARGE SUMMARY from hospital N/A    DISCHARGE REPORT from the ER N/A    LABS (any labs) Internal / CE    MEDICATION LIST Internal / CE    IMAGING  (NEED IMAGES AND REPORTS)     Osteomyelitis: Foot imaging  N/A    Liver Abscess: Abdominal imaging N/A    Other (anything related to diagnoses N/A

## 2021-10-08 ENCOUNTER — VIRTUAL VISIT (OUTPATIENT)
Dept: INFECTIOUS DISEASES | Facility: CLINIC | Age: 61
End: 2021-10-08
Attending: FAMILY MEDICINE
Payer: COMMERCIAL

## 2021-10-08 ENCOUNTER — PRE VISIT (OUTPATIENT)
Dept: INFECTIOUS DISEASES | Facility: CLINIC | Age: 61
End: 2021-10-08

## 2021-10-08 DIAGNOSIS — Z53.8 APPOINTMENT CANCELED BY HOSPITAL: ICD-10-CM

## 2021-10-08 DIAGNOSIS — S81.802A OPEN WOUND OF LEFT KNEE, LEG, AND ANKLE, INITIAL ENCOUNTER: Primary | ICD-10-CM

## 2021-10-08 DIAGNOSIS — S81.002A OPEN WOUND OF LEFT KNEE, LEG, AND ANKLE, INITIAL ENCOUNTER: Primary | ICD-10-CM

## 2021-10-08 DIAGNOSIS — S91.002A OPEN WOUND OF LEFT KNEE, LEG, AND ANKLE, INITIAL ENCOUNTER: Primary | ICD-10-CM

## 2021-10-08 DIAGNOSIS — R50.9 CHRONIC FEVER: ICD-10-CM

## 2021-10-08 NOTE — LETTER
10/8/2021       RE: Mauro Juárez  [address] [place] [address]     Dear Colleague,    Thank you for referring your patient, Mauro Juárez, to the [facility] [place] at [facility]. Please see a copy of my visit note below.    Called patient. He is no longer sick, hence wanted to cancel the appt. This was supposed to be an in person visit, but I had to change it to a virtual visit since I developed covid and patient did not seem happy about that. When I told him why I had to change it to virtual visit, he went on a yelling rant - why did you not get the covid shot then over and over again and when I was able to get in that I had gotten the covid shots he started yelling, then why get the shots if you get covid anyway. There was so much anger and yelling and I told him many times to stop yelling and he hung up.     Valerie Ramirez MD

## 2021-10-08 NOTE — PROGRESS NOTES
Called patient. He is no longer sick, hence wanted to cancel the appt. This was supposed to be an in person visit, but I had to change it to a virtual visit since I developed covid and patient did not seem happy about that. When I told him why I had to change it to virtual visit, he went on a yelling rant - why did you not get the covid shot then over and over again and when I was able to get in that I had gotten the covid shots he started yelling, then why get the shots if you get covid anyway. There was so much anger and yelling and I told him many times to stop yelling and he hung up.     Valerie Ramirez MD

## 2021-10-24 ENCOUNTER — HEALTH MAINTENANCE LETTER (OUTPATIENT)
Age: 61
End: 2021-10-24

## 2021-11-09 ENCOUNTER — OFFICE VISIT (OUTPATIENT)
Dept: CARDIOLOGY | Facility: CLINIC | Age: 61
End: 2021-11-09
Attending: GENETIC COUNSELOR, MS
Payer: COMMERCIAL

## 2021-11-09 ENCOUNTER — HOSPITAL ENCOUNTER (OUTPATIENT)
Dept: MRI IMAGING | Facility: CLINIC | Age: 61
End: 2021-11-09
Attending: PHYSICIAN ASSISTANT
Payer: COMMERCIAL

## 2021-11-09 ENCOUNTER — LAB (OUTPATIENT)
Dept: LAB | Facility: CLINIC | Age: 61
End: 2021-11-09
Attending: GENETIC COUNSELOR, MS
Payer: COMMERCIAL

## 2021-11-09 ENCOUNTER — OFFICE VISIT (OUTPATIENT)
Dept: CARDIOLOGY | Facility: CLINIC | Age: 61
End: 2021-11-09
Attending: INTERNAL MEDICINE
Payer: COMMERCIAL

## 2021-11-09 VITALS
OXYGEN SATURATION: 98 % | HEART RATE: 60 BPM | DIASTOLIC BLOOD PRESSURE: 87 MMHG | WEIGHT: 280.8 LBS | BODY MASS INDEX: 36.54 KG/M2 | SYSTOLIC BLOOD PRESSURE: 130 MMHG

## 2021-11-09 VITALS
SYSTOLIC BLOOD PRESSURE: 130 MMHG | DIASTOLIC BLOOD PRESSURE: 87 MMHG | HEART RATE: 60 BPM | OXYGEN SATURATION: 98 % | BODY MASS INDEX: 36.54 KG/M2 | WEIGHT: 280.8 LBS

## 2021-11-09 DIAGNOSIS — I42.1 HOCM (HYPERTROPHIC OBSTRUCTIVE CARDIOMYOPATHY) (H): Primary | ICD-10-CM

## 2021-11-09 DIAGNOSIS — I10 PRIMARY HYPERTENSION: ICD-10-CM

## 2021-11-09 DIAGNOSIS — I42.1 HOCM (HYPERTROPHIC OBSTRUCTIVE CARDIOMYOPATHY) (H): ICD-10-CM

## 2021-11-09 DIAGNOSIS — R93.1 ABNORMAL ECHOCARDIOGRAM: ICD-10-CM

## 2021-11-09 DIAGNOSIS — I87.2 CHRONIC VENOUS INSUFFICIENCY OF LOWER EXTREMITY: ICD-10-CM

## 2021-11-09 LAB
ATRIAL RATE - MUSE: 55 BPM
DIASTOLIC BLOOD PRESSURE - MUSE: NORMAL MMHG
INTERPRETATION ECG - MUSE: NORMAL
P AXIS - MUSE: 40 DEGREES
PR INTERVAL - MUSE: 202 MS
QRS DURATION - MUSE: 90 MS
QT - MUSE: 456 MS
QTC - MUSE: 436 MS
R AXIS - MUSE: 17 DEGREES
SYSTOLIC BLOOD PRESSURE - MUSE: NORMAL MMHG
T AXIS - MUSE: 30 DEGREES
VENTRICULAR RATE- MUSE: 55 BPM

## 2021-11-09 PROCEDURE — 99417 PROLNG OP E/M EACH 15 MIN: CPT | Performed by: INTERNAL MEDICINE

## 2021-11-09 PROCEDURE — 36415 COLL VENOUS BLD VENIPUNCTURE: CPT | Performed by: PATHOLOGY

## 2021-11-09 PROCEDURE — 93005 ELECTROCARDIOGRAM TRACING: CPT

## 2021-11-09 PROCEDURE — 255N000002 HC RX 255 OP 636: Performed by: PHYSICIAN ASSISTANT

## 2021-11-09 PROCEDURE — 75561 CARDIAC MRI FOR MORPH W/DYE: CPT | Mod: 26 | Performed by: INTERNAL MEDICINE

## 2021-11-09 PROCEDURE — A9585 GADOBUTROL INJECTION: HCPCS | Performed by: PHYSICIAN ASSISTANT

## 2021-11-09 PROCEDURE — G0463 HOSPITAL OUTPT CLINIC VISIT: HCPCS | Mod: 25

## 2021-11-09 PROCEDURE — 99000 SPECIMEN HANDLING OFFICE-LAB: CPT | Performed by: PATHOLOGY

## 2021-11-09 PROCEDURE — 96040 HC GENETIC COUNSELING, EACH 30 MINUTES: CPT | Performed by: GENETIC COUNSELOR, MS

## 2021-11-09 PROCEDURE — 75561 CARDIAC MRI FOR MORPH W/DYE: CPT

## 2021-11-09 PROCEDURE — 99205 OFFICE O/P NEW HI 60 MIN: CPT | Mod: 25 | Performed by: INTERNAL MEDICINE

## 2021-11-09 PROCEDURE — 93246 EXT ECG>7D<15D RECORDING: CPT

## 2021-11-09 RX ORDER — METOPROLOL SUCCINATE 25 MG/1
25 TABLET, EXTENDED RELEASE ORAL DAILY
Qty: 90 TABLET | Refills: 3 | Status: SHIPPED | OUTPATIENT
Start: 2021-11-09

## 2021-11-09 RX ORDER — GADOBUTROL 604.72 MG/ML
15 INJECTION INTRAVENOUS ONCE
Status: COMPLETED | OUTPATIENT
Start: 2021-11-09 | End: 2021-11-09

## 2021-11-09 RX ADMIN — GADOBUTROL 15 ML: 604.72 INJECTION INTRAVENOUS at 07:49

## 2021-11-09 ASSESSMENT — PAIN SCALES - GENERAL
PAINLEVEL: NO PAIN (0)
PAINLEVEL: NO PAIN (0)

## 2021-11-09 NOTE — RESULT ENCOUNTER NOTE
Here is a copy of your cardiac MRI. It looks like you were able to speak with the cardiology team today. Please let me know if you have further questions.   Cally Stewart PA-C

## 2021-11-09 NOTE — NURSING NOTE
Chief Complaint   Patient presents with     New Patient     New genetic HCM     Vitals were taken and medications reconciled.    Calixto Cespedes, EMT  12:49 PM

## 2021-11-09 NOTE — PROGRESS NOTES
Here is a copy of the progress note from your recent genetic counseling visit to [facility] on Date: 2021.    PROGRESS NOTE: Mauro was referred by Emil Arenas MD for genetic counseling due to his recent diagnosis of hypertrophic cardiomyopathy (HCM).  I had the opportunity to talk with Mauro and his wife Jurgen today to discuss the genetic component of HCM and testing options available to him.     MEDICAL HISTORY:Mauro reports that he was in good health until he got Lyme's disease this summer.  Part of his evaluation included an echocardiogram which raised suspicion for hypertrophic cardiomyopathy (HCM). MRi was performed today and showed moderate asymmetric septal hypertrophy, left ventricular ejection fraction (LVEF) of 63%, maximal wall thickness is 1.9 cm, and mitral valve ZOHRA with LVOT obstruction, which are consistent with hypertrophic obstructive cardiomyopathy.      Mauro denies history of fainting, dizziness or lightheadedness.     FAMILY HISTORY:A detailed family history was obtained today and was significant for the following cardiac history:      Father (85) has Alzheimers, neuropathy, and had prostate cancer.  His sister  at 67 years with cancer.  Little is know about his parents.    Mother (82) is very active but had lung cancer and has been on medications for high cholesterol.  Her brother  in his 80's.    Maternal grandfather  at 55 years after falling from a wagon and developing a blood clot.  Grandmother  of old age.    Three siblings with no known heart issues.    Three living children.  Daughter (25) has borderline high blood pressure and has times where she has rapid heart rate. Son (24) in good health.  Daughter (21) who has lightheadedness and fatigue.  She has fainted a couple of times after standing quickly.     A son  at 4 years from Wilm's tumor.      There is no additional history of cardiomyopathy, arrhythmias,  heart attacks, fainting, sudden cardiac death, genetic conditions, or birth defects. (Scanned pedigree may be under media tab)    DISCUSSION: Reviewed definition of hypertrophic cardiomyopathy (HCM). Explained that a good portion of HCM cases have a genetic component.     Reviewed autosomal dominant (AD) inheritance pattern most commonly associated with HCM, including the 50% risk for recurrence. Briefly reviewed autosomal recessive and X-linked inheritance. Explained that HCM gene mutations are associated with reduced penetrance and variable expressivity, meaning that individuals who carry a gene mutation may or may not get the disease and onset and severity can vary from one family member to the next. This explains why you may not see cardiomyopathy in each generation of a family.     Currently over 25 genes have been found to be related to HCM. Genetic testing currently identifies mutations in about 50-60% of idiopathic cases. Reviewed capabilities, limitations, and logistics of testing.  DNA sample via saliva or blood is collected and sent to testing lab for evaluation of selected genes. The results could directly impact care and treatment.      Explained three possible outcomes of genetic testing including: positive identification of a mutation, no mutation identified, and identification of a variant of unknown significance (VUS). If a mutation is identified, presymptomatic testing would be available to at risk family members. If no mutation is identified, it does not rule out the possibility of a genetic component to this disease. Family members could still be at risk for developing the same condition. If a VUS is identified, it is unclear if the mutation is disease causing or just a normal variation. It may take time and possibly additional testing to determine the meaning of a VUS result.     Test results take approximately 2-4 weeks on average. Discussed cost of testing through commercial labs. Explained  that the lab will work with insurance to determine coverage and contact patient if out of pocket costs are expected to exceed $100. Discussed self pay option of $250.    Discussed pros and cons of genetic testing. Explained that results could determine the cause for HCM. If a mutation is identified, presymptomatic testing is available to all at risk relatives. Reviewed possible issues associated with presymptomatic testing including genetic discrimination, current laws to prevent discrimination (ie. [place]), insurance issues, and emotional and psychosocial outcomes of testing.     Recommend clinical evaluation for all first degree relatives (parents, siblings, and children) of an affected individual regardless of decision to pursue genetic testing. Based on the Heart Failure Society of [place] Practice Guidelines (Khadar et al, 2018), clinical evaluation should be performed at least every 3 years, except yearly during puberty, and should include history, cardiac exam, ECHO, EKG, Holter monitoring, and exercise treadmill.    All questions answered at this time.     PLAN:Mauro elected to proceed with genetic testing.  Requisition and consent forms were completed and signed.  DNA will be collected via blood sample and sent to [facility]. I will contact patient's wife Jurgen, per his request, when results are available.    TOTAL TIME SPENT IN COUNSELIN minutes    Kaylene Fritz MS, [facility]  Licensed, Certified Genetic Counselor  [facility]

## 2021-11-09 NOTE — PROGRESS NOTES
Per  patient to have 14 DAY ZIOPATCH  monitor placed.  Diagnosis: hocm  Monitor placed: Yes  Patient Instructed: Yes  Patient verbalized understanding: Yes  Holter # [id number]  PLACED BY TED ALCALA

## 2021-11-09 NOTE — LETTER
2021      RE: Mauro Juárez  [address] [place] [address]       Dear Colleague,    Thank you for the opportunity to participate in the care of your patient, Mauro Juárez, at the [facility] [place] at [facility]. Please see a copy of my visit note below.    Here is a copy of the progress note from your recent genetic counseling visit to the [facility] on Date: 2021.    PROGRESS NOTE: Mauro was referred by Emil Arenas MD for genetic counseling due to his recent diagnosis of hypertrophic cardiomyopathy (HCM).  I had the opportunity to talk with Mauro and his wife Jurgen today to discuss the genetic component of HCM and testing options available to him.     MEDICAL HISTORY:Mauro reports that he was in good health until he got Lyme's disease this summer.  Part of his evaluation included an echocardiogram which raised suspicion for hypertrophic cardiomyopathy (HCM). MRi was performed today and showed moderate asymmetric septal hypertrophy, left ventricular ejection fraction (LVEF) of 63%, maximal wall thickness is 1.9 cm, and mitral valve ZOHRA with LVOT obstruction, which are consistent with hypertrophic obstructive cardiomyopathy.      Mauro denies history of fainting, dizziness or lightheadedness.     FAMILY HISTORY:A detailed family history was obtained today and was significant for the following cardiac history:      Father (85) has Alzheimers, neuropathy, and had prostate cancer.  His sister  at 67 years with cancer.  Little is know about his parents.    Mother (82) is very active but had lung cancer and has been on medications for high cholesterol.  Her brother  in his 80's.    Maternal grandfather  at 55 years after falling from a wagon and developing a blood clot.  Grandmother  of old age.    Three siblings with no known heart issues.    Three living children.  Daughter  (25) has borderline high blood pressure and has times where she has rapid heart rate. Son (24) in good health.  Daughter (21) who has lightheadedness and fatigue.  She has fainted a couple of times after standing quickly.     A son  at 4 years from Wilm's tumor.      There is no additional history of cardiomyopathy, arrhythmias, heart attacks, fainting, sudden cardiac death, genetic conditions, or birth defects. (Scanned pedigree may be under media tab)    DISCUSSION: Reviewed definition of hypertrophic cardiomyopathy (HCM). Explained that a good portion of HCM cases have a genetic component.     Reviewed autosomal dominant (AD) inheritance pattern most commonly associated with HCM, including the 50% risk for recurrence. Briefly reviewed autosomal recessive and X-linked inheritance. Explained that HCM gene mutations are associated with reduced penetrance and variable expressivity, meaning that individuals who carry a gene mutation may or may not get the disease and onset and severity can vary from one family member to the next. This explains why you may not see cardiomyopathy in each generation of a family.     Currently over 25 genes have been found to be related to HCM. Genetic testing currently identifies mutations in about 50-60% of idiopathic cases. Reviewed capabilities, limitations, and logistics of testing.  DNA sample via saliva or blood is collected and sent to testing lab for evaluation of selected genes. The results could directly impact care and treatment.      Explained three possible outcomes of genetic testing including: positive identification of a mutation, no mutation identified, and identification of a variant of unknown significance (VUS). If a mutation is identified, presymptomatic testing would be available to at risk family members. If no mutation is identified, it does not rule out the possibility of a genetic component to this disease. Family members could still be at risk for  developing the same condition. If a VUS is identified, it is unclear if the mutation is disease causing or just a normal variation. It may take time and possibly additional testing to determine the meaning of a VUS result.     Test results take approximately 2-4 weeks on average. Discussed cost of testing through commercial labs. Explained that the lab will work with insurance to determine coverage and contact patient if out of pocket costs are expected to exceed $100. Discussed self pay option of $250.    Discussed pros and cons of genetic testing. Explained that results could determine the cause for HCM. If a mutation is identified, presymptomatic testing is available to all at risk relatives. Reviewed possible issues associated with presymptomatic testing including genetic discrimination, current laws to prevent discrimination (ie. [place]), insurance issues, and emotional and psychosocial outcomes of testing.     Recommend clinical evaluation for all first degree relatives (parents, siblings, and children) of an affected individual regardless of decision to pursue genetic testing. Based on the Heart Failure Society of [place] Practice Guidelines (Khadar et al, 2018), clinical evaluation should be performed at least every 3 years, except yearly during puberty, and should include history, cardiac exam, ECHO, EKG, Holter monitoring, and exercise treadmill.    All questions answered at this time.     PLAN:Muaro elected to proceed with genetic testing.  Requisition and consent forms were completed and signed.  DNA will be collected via blood sample and sent to [facility] laboratory. I will contact patient's wife Jurgen, per his request, when results are available.    TOTAL TIME SPENT IN COUNSELIN minutes    Kaylene Fritz MS, [facility]  Licensed, Certified Genetic Counselor  [facility]

## 2021-11-09 NOTE — PROGRESS NOTES
Chief complaint: Evaluation for HCM    HPI:   Mauro Juárez is a 60 year old male who was referred to our HCM clinic for evaluation.     He was seen 21 by his PCP Dr. Gregory (notes from 21 and 9/10/21 reviewed) for chronic fever, splenomegaly, and elevated LFTs. He was treated for a chronic left lower extremity wound infection (related to chronic venous stasis wounds) and also subsequently for Lyme disease. TTE done as part of a broader infectious workup showed findings suspicious for HCM with resting LVOT gradient of 92 mmHg prompting CMR (see below for details) and referral for further evaluation.    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 While working, patient states he had no symptoms of exertional lightheadedness or dizziness. No dyspnea on exertion or chest pain. No syncope.     Patient reported one episode couple of years ago while working at a friends house, he felt lightheaded and dizzy to the point, where he almost passed out. He never had a similar episode since that time.     Patient continues to be active today, albeit he is somewhat limited by shoulder and hip surgery. However, he denies any exertional lightheadedness or dizziness. No chest pain or shortness of breath. No PND, orthopnea, or LE edema.     Patient has no family history of sudden cardiac death. His parents are still alive. He has 3 siblings in good health. He had 4 kids, which one of them  of emilia tumor at a young age.    ECG today shows: Sinus bradycardia. HR: 55. No ST-T changes.    PMH:  Chronic venous insufficiency LLE since ~ related to a series of accidental injuries and postphlebitic sydnrome  HTN  GERD    PAST MEDICAL HISTORY:  Past Medical History:   Diagnosis Date     Former tobacco use        CURRENT MEDICATIONS:  Current Outpatient Medications   Medication  Sig Dispense Refill     amoxicillin-clavulanate (AUGMENTIN) 875-125 MG tablet TAKE 1 TABLET BY MOUTH TWICE A DAY (Patient not taking: Reported on 9/9/2021)       aspirin 81 MG EC tablet Take 81 mg by mouth daily (Patient not taking: Reported on 8/10/2021)       calcium carbonate (CALCIUM CARBONATE) 600 MG tablet Take 600 mg by mouth 2 times daily (with meals) (Patient not taking: Reported on 8/10/2021)       Cholecalciferol (VITAMIN D3) 50 MCG (2000 UT) CAPS Take 2,000 Units by mouth daily (Patient not taking: Reported on 8/10/2021)       docusate sodium (COLACE) 100 MG capsule Take 100 mg by mouth 2 times daily as needed (Patient not taking: Reported on 8/10/2021)       gabapentin (NEURONTIN) 100 MG capsule Take one cap in am, increase as directed to 300 mg TID (Patient not taking: Reported on 8/10/2021) 180 capsule 1     HYDROcodone-acetaminophen (NORCO) 5-325 MG tablet Take 1-2 tablets by mouth every 12 hours as needed for pain (Patient not taking: Reported on 8/10/2021)  0     IBUPROFEN PO  (Patient not taking: Reported on 8/23/2021)       levofloxacin (LEVAQUIN) 750 MG tablet Take 750 mg by mouth daily (Patient not taking: Reported on 9/9/2021)       omeprazole (PRILOSEC) 40 MG DR capsule Take 1 capsule (40 mg) by mouth every morning On an empty stomach (Patient not taking: Reported on 8/10/2021) 30 capsule 2     order for DME Equipment being ordered: TENS (Patient not taking: Reported on 8/10/2021) 1 Units 0     tamsulosin (FLOMAX) 0.4 MG capsule Take 1 capsule (0.4 mg) by mouth daily (Patient not taking: Reported on 8/10/2021) 90 capsule 0       ALLERGIES:     Allergies   Allergen Reactions     Penicillins      Other reaction(s): Edema  As a child     Triple Antibiotic Rash       FAMILY HISTORY:  Family History   Problem Relation Age of Onset     Lung Cancer Mother      Bladder Cancer Father        SOCIAL HISTORY:  Social History     Tobacco Use     Smoking status: Former Smoker     Packs/day: 0.00     Years:  16.00     Pack years: 0.00     Types: Cigarettes, Dip, chew, snus or snuff     Quit date: 10/21/1999     Years since quittin.0     Smokeless tobacco: Former User     Tobacco comment: smoked x 6 years, chew for 10   Substance Use Topics     Alcohol use: Not Currently     Drug use: Never       ROS:   A comprehensive 14 point review of systems is negative other than as mentioned in HPI.    Exam:  /87 (BP Location: Right arm, Patient Position: Chair, Cuff Size: Adult Large)   Pulse 60   Wt 127.4 kg (280 lb 12.8 oz)   SpO2 98%   BMI 36.54 kg/m      GENERAL APPEARANCE: healthy, alert and no distress  EYES: no icterus, no xanthelasmas  ENT: normal palate, mucosa moist, no central cyanosis  NECK: JVP not elevated  RESPIRATORY: lungs clear to auscultation - no rales, rhonchi or wheezes, no use of accessory muscles, no retractions, respirations are unlabored, normal respiratory rate  CARDIOVASCULAR: regular rhythm, normal S1 and S2. High pitched 3/6 systolic mumur which increased with handgrip.  GI: soft, non tender, bowel sounds normal,no abdominal bruits  EXTREMITIES: no edema, no bruits  NEURO: alert and oriented to person/place/time, normal speech, gait and affect  VASC: Radial, dorsalis pedis and posterior tibialis pulses 2+ bilaterally.  SKIN: no ecchymoses, no rashes.  PSYCH: cooperative, affect appropriate.     Labs:  Reviewed.     Testing/Procedures:    Dr. Arenas personally visualized and interpreted:  ECG 21: Normal sinus rhythm, VR 61,  QRS 87 QTc 425  TTE 21: HCM with asymmetric septal hypertrophy and ZOHRA resulting in resting LVOT gradient of 92 mmHg.  CMR 21: HCM with asymmetric septal hypertrophy, maximal thickness 1.9 cm at the level of the basal anteroseptum. ZOHRA resulting in flow acceleration through the LVOT. Typical ancillary findings of HCM (clefts, accessory and apically displaced papillary muscles.) No significant fibrosis/scar on late gadolinium enhancement  imaging. No LV apical aneurysm.    Assessment and Plan:   1. Hypertrophic cardiomyopathy with LVOT obstruction    Mr. Juárez was counseled at length regarding the nature of hypertrophic cardiomyopathy including its genetic nature, its natural history, and potential complications.     We discussed the potential complications of HCM, including outflow obstruction, arrhythmias, and heart failure.  Mr. Juárez does have a moderate degree of resting LVOT obstruction on his echocardiograms (peak resting LVOT gradient 92 mmHg).  He was counseled regarding behavioral interventions to avoid worsening outflow obstruction, in particular avoiding dehydration and minimizing diuretics and peripheral vasodilators.      Mr. Juárez has no identified high-risk features for sudden death. In particular, he does not have significant LGE burden on his cardiac MRI. His maximal LV wall thickness is 1.9 cm. He also has no family history of SCD. Mr. Juárez is going to undergo ambulatory ECG monitoring (4-day ZioPatch) with his additional workup to assess his burden of ectopy and arrhythmias.     Mr. Juárez does not have any clear evidence of functional limitation. We discussed the activity recommendations hypertrophic cardiomyopathy, and in particular that moderate intensity aerobic exercise is encouraged.      We discussed the genetic nature of HCM and its generally autosomal dominant inheritance. Mr. Juárez was counseled that all of his 1st-degree relatives should be screened and explained the strategies of clinical screening and genetic testing. He is aware that a pathogentic mutation is only identified in 40-50% of patients, and that in the absence of an identified mutation that would allow cascade testing of her family members, that all first-degree relatives should undergo clinical screening. Mr. Juárez elected to undergo genetic testing.        Plan in brief:  -Start Toprol XL 25mg daily (Will start low dose, given patients bradycardia on ECG  today).  -14-day ZioPatch   -CPX when able.    Patient was discussed with the above and is in agreement with the plan.  Patient was seen and discussed with Dr. Evans.    Dilip Hyde MD  Cardiology fellow    ATTENDING ATTESTATION     Patient was seen and evaluated with Dr. Hyde. History was confirmed personally by me. Labs, imaging studies, EKGs, and telemetry were reviewed. Agree with assessment and plan as outlined above. The note has been edited by me as needed to produce a single, cohesive document.     Chart review time 11/9/21: 45 minutes  Visit time 11/9/21: 45 minutes  Total time spent 11/9/21: 90 minutes    Emil Arenas MD  Staff Cardiologist

## 2021-11-09 NOTE — LETTER
2021      RE: Mauro Juárez  [address] [facility] [address]       Dear Colleague,    Thank you for the opportunity to participate in the care of your patient, Mauro Juárez, at the [facility] [place] at [facility]. Please see a copy of my visit note below.    Chief complaint: Evaluation for HCM    HPI:   Mauro Juárez is a 60 year old male who was referred to our HCM clinic for evaluation.     He was seen 21 by his PCP Dr. Gregory (notes from 21 and 9/10/21 reviewed) for chronic fever, splenomegaly, and elevated LFTs. He was treated for a chronic left lower extremity wound infection (related to chronic venous stasis wounds) and also subsequently for Lyme disease. TTE done as part of a broader infectious workup showed findings suspicious for HCM with resting LVOT gradient of 92 mmHg prompting CMR (see below for details) and referral for further evaluation.    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 While working, patient states he had no symptoms of exertional lightheadedness or dizziness. No dyspnea on exertion or chest pain. No syncope.     Patient reported one episode couple of years ago while working at a friends house, he felt lightheaded and dizzy to the point, where he almost passed out. He never had a similar episode since that time.     Patient continues to be active today, albeit he is somewhat limited by shoulder and hip surgery. However, he denies any exertional lightheadedness or dizziness. No chest pain or shortness of breath. No PND, orthopnea, or LE edema.     Patient has no family history of sudden cardiac death. His parents are still alive. He has 3 siblings in good health. He had 4 kids, which one of them  of emilia tumor at a young age.    ECG today shows: Sinus  bradycardia. HR: 55. No ST-T changes.    PMH:  Chronic venous insufficiency LLE since ~2006 related to a series of accidental injuries and postphlebitic sydnrome  HTN  GERD    PAST MEDICAL HISTORY:  Past Medical History:   Diagnosis Date     Former tobacco use        CURRENT MEDICATIONS:  Current Outpatient Medications   Medication Sig Dispense Refill     amoxicillin-clavulanate (AUGMENTIN) 875-125 MG tablet TAKE 1 TABLET BY MOUTH TWICE A DAY (Patient not taking: Reported on 9/9/2021)       aspirin 81 MG EC tablet Take 81 mg by mouth daily (Patient not taking: Reported on 8/10/2021)       calcium carbonate (CALCIUM CARBONATE) 600 MG tablet Take 600 mg by mouth 2 times daily (with meals) (Patient not taking: Reported on 8/10/2021)       Cholecalciferol (VITAMIN D3) 50 MCG (2000 UT) CAPS Take 2,000 Units by mouth daily (Patient not taking: Reported on 8/10/2021)       docusate sodium (COLACE) 100 MG capsule Take 100 mg by mouth 2 times daily as needed (Patient not taking: Reported on 8/10/2021)       gabapentin (NEURONTIN) 100 MG capsule Take one cap in am, increase as directed to 300 mg TID (Patient not taking: Reported on 8/10/2021) 180 capsule 1     HYDROcodone-acetaminophen (NORCO) 5-325 MG tablet Take 1-2 tablets by mouth every 12 hours as needed for pain (Patient not taking: Reported on 8/10/2021)  0     IBUPROFEN PO  (Patient not taking: Reported on 8/23/2021)       levofloxacin (LEVAQUIN) 750 MG tablet Take 750 mg by mouth daily (Patient not taking: Reported on 9/9/2021)       omeprazole (PRILOSEC) 40 MG DR capsule Take 1 capsule (40 mg) by mouth every morning On an empty stomach (Patient not taking: Reported on 8/10/2021) 30 capsule 2     order for DME Equipment being ordered: TENS (Patient not taking: Reported on 8/10/2021) 1 Units 0     tamsulosin (FLOMAX) 0.4 MG capsule Take 1 capsule (0.4 mg) by mouth daily (Patient not taking: Reported on 8/10/2021) 90 capsule 0       ALLERGIES:     Allergies   Allergen  Reactions     Penicillins      Other reaction(s): Edema  As a child     Triple Antibiotic Rash       FAMILY HISTORY:  Family History   Problem Relation Age of Onset     Lung Cancer Mother      Bladder Cancer Father        SOCIAL HISTORY:  Social History     Tobacco Use     Smoking status: Former Smoker     Packs/day: 0.00     Years: 16.00     Pack years: 0.00     Types: Cigarettes, Dip, chew, snus or snuff     Quit date: 10/21/1999     Years since quittin.0     Smokeless tobacco: Former User     Tobacco comment: smoked x 6 years, chew for 10   Substance Use Topics     Alcohol use: Not Currently     Drug use: Never       ROS:   A comprehensive 14 point review of systems is negative other than as mentioned in HPI.    Exam:  /87 (BP Location: Right arm, Patient Position: Chair, Cuff Size: Adult Large)   Pulse 60   Wt 127.4 kg (280 lb 12.8 oz)   SpO2 98%   BMI 36.54 kg/m      GENERAL APPEARANCE: healthy, alert and no distress  EYES: no icterus, no xanthelasmas  ENT: normal palate, mucosa moist, no central cyanosis  NECK: JVP not elevated  RESPIRATORY: lungs clear to auscultation - no rales, rhonchi or wheezes, no use of accessory muscles, no retractions, respirations are unlabored, normal respiratory rate  CARDIOVASCULAR: regular rhythm, normal S1 and S2. High pitched 3/6 systolic mumur which increased with handgrip.  GI: soft, non tender, bowel sounds normal,no abdominal bruits  EXTREMITIES: no edema, no bruits  NEURO: alert and oriented to person/place/time, normal speech, gait and affect  VASC: Radial, dorsalis pedis and posterior tibialis pulses 2+ bilaterally.  SKIN: no ecchymoses, no rashes.  PSYCH: cooperative, affect appropriate.     Labs:  Reviewed.     Testing/Procedures:    Dr. Arenas personally visualized and interpreted:  ECG 21: Normal sinus rhythm, VR 61,  QRS 87 QTc 425  TTE 21: HCM with asymmetric septal hypertrophy and ZOHRA resulting in resting LVOT gradient of 92  mmHg.  CMR 11/9/21: HCM with asymmetric septal hypertrophy, maximal thickness 1.9 cm at the level of the basal anteroseptum. ZOHRA resulting in flow acceleration through the LVOT. Typical ancillary findings of HCM (clefts, accessory and apically displaced papillary muscles.) No significant fibrosis/scar on late gadolinium enhancement imaging. No LV apical aneurysm.    Assessment and Plan:   1. Hypertrophic cardiomyopathy with LVOT obstruction    Mr. Juárez was counseled at length regarding the nature of hypertrophic cardiomyopathy including its genetic nature, its natural history, and potential complications.     We discussed the potential complications of HCM, including outflow obstruction, arrhythmias, and heart failure.  Mr. Juárez does have a moderate degree of resting LVOT obstruction on his echocardiograms (peak resting LVOT gradient 92 mmHg).  He was counseled regarding behavioral interventions to avoid worsening outflow obstruction, in particular avoiding dehydration and minimizing diuretics and peripheral vasodilators.      Mr. Juárez has no identified high-risk features for sudden death. In particular, he does not have significant LGE burden on his cardiac MRI. His maximal LV wall thickness is 1.9 cm. He also has no family history of SCD. Mr. Juárez is going to undergo ambulatory ECG monitoring (4-day ZioPatch) with his additional workup to assess his burden of ectopy and arrhythmias.     Mr. Juárez does not have any clear evidence of functional limitation. We discussed the activity recommendations hypertrophic cardiomyopathy, and in particular that moderate intensity aerobic exercise is encouraged.      We discussed the genetic nature of HCM and its generally autosomal dominant inheritance. Mr. Juárez was counseled that all of his 1st-degree relatives should be screened and explained the strategies of clinical screening and genetic testing. He is aware that a pathogentic mutation is only identified in 40-50% of  patients, and that in the absence of an identified mutation that would allow cascade testing of her family members, that all first-degree relatives should undergo clinical screening. Mr. Juárez elected to undergo genetic testing.        Plan in brief:  -Start Toprol XL 25mg daily (Will start low dose, given patients bradycardia on ECG today).  -14-day ZioPatch   -CPX when able.    Patient was discussed with the above and is in agreement with the plan.  Patient was seen and discussed with Dr. Evans.    Dilip Hyde MD  Cardiology fellow    ATTENDING ATTESTATION     Patient was seen and evaluated with Dr. Hyde. History was confirmed personally by me. Labs, imaging studies, EKGs, and telemetry were reviewed. Agree with assessment and plan as outlined above. The note has been edited by me as needed to produce a single, cohesive document.     Chart review time 11/9/21: 45 minutes  Visit time 11/9/21: 45 minutes  Total time spent 11/9/21: 90 minutes    Emil Arenas MD  Staff Cardiologist

## 2021-11-09 NOTE — NURSING NOTE
Cardiac Testing: Patient given instructions regarding CPX and  stress echocardiogram . Discussed purpose, preparation, procedure and when to expect results reported back to the patient. Patient demonstrated understanding of this information and agreed to call with further questions or concerns.    Diet: Patient instructed regarding a heart healthy diet, including discussion of reduced fat and sodium intake. Patient demonstrated understanding of this information and agreed to call with further questions or concerns.     Med Reconcile: Reviewed and verified all current medications with the patient. The updated medication list was printed and given to the patient.    New Medication: Toprol XL 25mg. Patient was educated regarding newly prescribed medication, including discussion of  the indication, administration, side effects, and when to report to MD or RN. Patient demonstrated understanding of this information and agreed to call with further questions or concerns.    Return Appointment: Patient given instructions regarding scheduling next clinic visit. Patient demonstrated understanding of this information and agreed to call with further questions or concerns.    Patient stated he understood all health information given and agreed to call with further questions or concerns.    Kaylene Perry, MSN, RN, CNL  Cardiology Care Coordinator  [facility]  [phone]

## 2021-11-09 NOTE — PATIENT INSTRUCTIONS
"Indication for Genetic Counseling:     Hypertrophic cardiomyopathy (HCM) is a condition that causes part of the heart muscle (the left ventricle) to become thick and enlarged (hypertrophy).  It is usually diagnosed by echocardiogram (ECHO) or cardiac magnetic resonance imaging (MRI).  When the heart becomes enlarged, it cannot pump blood as effectively, which can lead to symptoms such as shortness of breath, fatigue, chest pains, irregular heartbeat, fainting, stroke, cardiac arrest, and sudden cardiac death (SCD).  HCM can be caused by a variety of factors, such as chronic hypertension, a narrow aorta, athletic heart, or genetics.  There are at least 20 known genes that, when not working properly, can cause HCM.    Inheritance:   Humans have over 20,000 genes that instruct our bodies how to function.  We have two copies of each gene because we inherit one from our mother and one from our father.  In most cardiac cases with a genetic component, the condition is inherited in an autosomal dominant (AD) pattern.  This means that in order to have the condition, a person needs to inherit a mutation on one copy of a particular gene.  This mutation or pathogenic variant dominates the \"normal\" working copy of the gene.  When an affected individual has children, they can either pass on the \"normal\" copy of the gene or the mutation.  Therefore, children have a 50% chance of inheriting the mutation.  Other family members also have an increased risk but the specific risk depends on the degree of relationship.  Additional inheritance patterns can occur within families and may alter the risk of recurrence.     Testing Options:   Genetic testing is available to assess a panel of genes known to cause this condition.  This test reads through the DNA (sequencing) of these genes to look for spelling mistakes or mutations that could cause the condition.      There are three types of results you could receive from this test.     " -Positive result (mutation identified) - confirms diagnosis and provides an answer to why this happened.  In addition, identifying a mutation allows family members to have testing to determine their risk.     -Negative result (mutation not identified) - no genetic changes were identified.  This does not rule out a genetic cause for the condition as the genetic testing only identifies 50-60% of genetic causes for this condition.    -Variant of uncertain significance (VUS) - a genetic change was identified, but there is not enough information to determine whether it is disease-causing or normal human genetic variation.     Although genetic testing may identify a mutation, it cannot provide information about the severity of symptoms or the progression of disease.  We cannot predict age of onset or severity of symptoms due to reduced penetrance and variable expressivity.    Logistics:   Genetic test involves test a sample of DNA, thru blood, saliva, or cheek cells.  The sample will be sent to a laboratory to extract the DNA and sequence the genes for mutations.  The laboratory will work with your insurance company to determine the out of pocket (OOP) cost and will notify you if the OOP cost is greater than $100.  Remember to ask the lab about financial assistance pricing and self pay options as well.  Sometimes those are much lower than insurance pricing.  When testing is initiated, results take about 2-4 weeks to return. I will contact you over the phone when results are available.     Genetic Information and Nondiscrimination Act:  The Genetic Information and Nondiscrimination Act of 2008 (FRANCHESCA) is a federal law that protects individuals from genetic discrimination in health insurance and employment. Genetic discrimination is defined as the misuse of genetic information. This law does not address potential discrimination regarding life insurance or disability insurance.      This is especially relevant for at risk  individuals who are considering presymptomatic testing.    Screening Recommendations:  Recommend clinical evaluation for all first degree relatives (parents, siblings, and children) of an affected individual regardless of decision to pursue genetic testing.  Clinical evaluation should be performed at least every 3 years, except yearly during puberty, and should include history, cardiac exam, ECHO, EKG, Holter monitoring, and exercise treadmill.    Resources:  Hypertrophic Cardiomyopathy Association - 4hcm.org  Children's Cardiomyopathy Foundation - childrenscardiomyopathy.org  [place] Cardiomyopathy Association - cardiomyopathy.org  HCM Care phone shae    General   American Heart Association - americanheart.org  Genetics Home Reference - ghr.Vindi.nih.gov  Genetic Information and Nondiscrimination Act - ginahelp.org    Contact Information:  Kaylene Fritz MS  Licensed Genetic Counselor  Adult Congenital and Cardiovascular Genetics Center  [facility]    Office:  [phone]  Appointments:  [phone]  Fax: [phone]  Email: [email or web address]

## 2021-11-09 NOTE — PATIENT INSTRUCTIONS
You were seen today in the Adult Congenital and Cardiovascular Genetics Clinic at the [facility].    Cardiology Providers you saw during your visit:  Emil Arenas MD    Diagnosis:  HCM    Results:  Emil Arenas MD reviewed the results of your cardiac MRI and EKG testing today in clinic.    Recommendations:    1. Continue to eat a heart healthy, low salt diet.  2. Continue to get 20-30 minutes of aerobic activity, 4-5 days per week.  Examples of aerobic activity include walking, running, swimming, cycling, etc.  3. Continue to observe good oral hygiene, with regular dental visits.  4. Begin taking Toprol XL 25 mg daily.  5. Please wear a Zio patch for 14 days.    CardioPulmonary Stress Test (CPX)  CPX is a maximal (meaning you exercise to exhaustion, not to achieve a heart rate) exercise test where we measure how well your heart/body uses oxygen or energy. It is the gold standard for measuring functional capacity and helps us differentiate limitations due to lungs, heart, or fitness.   A CPX is NOT a typical stress test. You will NOT be asked to hold your Beta Blocker medication.   You will be scheduled for a CardioPulmonary Stress Test at the [facility] ([address], [facility] [address], [phone]).       Follow these instructions:    1. Report to the GOLD waiting room in the [facility] hospital.  2. Nothing to eat for 3 hours prior to your test. You may have clear liquids up to the time of your test  3. Please wear loose, two-piece clothing and comfortable, rubber soled shoes for walking     For Patients with Diabetes: Your RN Coordinator will give you instructions on adjusting your diabetic medications for the day of your test                           If you have questions please contact your diabetic care team                           Remember to  bring your glucometer & insulin with you to take after your test if needed       Stress Echo Test  Exercise or Chemical  Stress Testing                               What happens during this test?               The Echo  Before and after your stress test, we will do an ultrasound of your heart.This device uses sound waves and a video screen to show how your heart is working.You will lie on your left side. Men will remove their shirt. Women will wear a gown that opens in the front.We will move a small probe around on your chest. The probe is smeared with gel.We will see a picture of your heart on the screen. If the picture isn t clear, we may inject a liquid into an IV in your hand or arm. The liquid helps your heart show up better in the pictures.                                             The stress test   We will put a blood pressure cuff on your arm. We will attach small pads to your chest. The pads are hooked to EKG (electrocardiogram) machine that shows how your heart is working. You will begin the stress test.  If you can exercise, you will walk on a treadmill or ride a bike for 5 to 15 minutes. You will start at a slow speed. We will slowly increase the speed, incline or resistance.    If you cannot exercise, we will give you medicine to mimic the effects of exercise. The medicine goes through the IV slowly                                         How do I get ready for the test?    1. Stop all caffeine 12 hours before the test. This includes coffee, tea, soda pop, chocolate and certain medicines (such as Anacin and Excedrin). Also avoid decaf coffee and tea.  2.  No alcohol, smoking or other tobacco for 12 hours before the test.   3. Stop eating 3 hours before the test. You may drink water or juice.   4. Wear a loose, two-piece outfit and walking shoes.   5.Please do not wear scented lotions or perfumes.      SBE prophylaxis:   Yes____  No_X_    Lifelong Bacterial Endocarditis Prophylaxis:  YES____  NO____    If YES is checked, follow the recommendations outlined below:  1. Take antibiotic(s) prior to recommended dental  procedures and procedures on the respiratory tract or with infected skin, muscle or bones. SBE prophylaxis is not needed for routine GI and  procedures (ie. Colonoscopy or vaginal delivery)  2. Observe good oral hygiene daily, as advised by your dentist. Get regular professional dental care.  3. Keep cuts clean.  4. Infections should be treated promptly.  5. Symptoms of Infective Endocarditis could include: fever lasting more than 4-5 days or a recurrent fever that initially resolves but returns within 1-2 days)      Exercise restrictions:   Yes__X__  No____         If yes, list restrictions:  Must be allowed to rest if fatigued or SOB      Work restrictions:  Yes____  No_X___         If yes, list restrictions:    FASTING CHOLESTEROL was checked in the last 5 years YES_X__  NO___ (2019)  Continue to eat a heart healthy, low salt diet.         ____ Fasting lipid panel order today         ____ No changes in medications          ____ I recommend the following changes in your cholesterol medications.:          ____ Please follow up for cholesterol screening at your primary care physician      Follow-up:  Follow up with Dr. Arenas in 6 months with HCM stress panel.     If you have questions or concerns please contact us at:    Kaylene Perry MSN, RN, CNL  Shelia Darling (Scheduling)  Nurse Care Coordinator     Clinic   Adult Congenital and CV Genetics   Adult Congenital and CV Genetic  [facility]  (P) [phone]     (P) [phone]         (F) [phone]        For after hours urgent needs, call [phone] and ask to speak to the Adult Congenital Physician on call.  Mention Job Code 0401.    For emergencies call 911.    [facility]   Clinics and Surgery Center  Mail Code [id number]  [address] [place], [place] [address]

## 2021-11-22 ENCOUNTER — TELEPHONE (OUTPATIENT)
Dept: CARDIOLOGY | Facility: CLINIC | Age: 61
End: 2021-11-22
Payer: COMMERCIAL

## 2021-11-22 LAB
Lab: 8936
PERFORMING LABORATORY: NORMAL
SPECIMEN STATUS: NORMAL
TEST NAME: NORMAL

## 2021-11-30 NOTE — TELEPHONE ENCOUNTER
Per Mauro's request, I spoke with his wife Jurgen today to review results of Mauro's genetic testing. He underwent genetic testing on November 9, 2021 due to his diagnosis of hypertrophic cardiomyopathy (HCM).   Genetic testing for the HCM panel thru [employer] revealed that Mauro does NOT carry a mutation in the 30 genes analyzed. It is predicted that this panel will identify mutations in 50-60% of HCM cases.   Therefore, this negative result does not rule out a genetic basis for the HCM in Mauro but eliminates the option of presymptomatic genetic testing in at risk family members, at this time. However, since this condition could still be genetic, clinical screening is recommended in all first degree relatives (children, siblings, parents).  Clinical screening should include cardiac exam, ECHO, and EKG to assess their current status. Testing may also include heart monitor, stress testing, and MRI.  Explained that with continued research and genetic knowledge the detection rate for identifying mutations may increase over time. Therefore, it is worth touching base in the years to come to learn about new testing options.   A summary letter and copy of the results will be sent to patient. All questions answered at this time.   Kaylene Fritz MS, [facility]  Licensed, Certified Genetic Counselor  Adult Congenital and Cardiovascular Genetics Center  [facility]

## 2022-02-13 ENCOUNTER — HEALTH MAINTENANCE LETTER (OUTPATIENT)
Age: 62
End: 2022-02-13

## 2022-05-04 ENCOUNTER — TELEPHONE (OUTPATIENT)
Dept: CARDIOLOGY | Facility: CLINIC | Age: 62
End: 2022-05-04
Payer: COMMERCIAL

## 2022-05-04 NOTE — TELEPHONE ENCOUNTER
Called patient to confirm appt and pt wished to cancel due to being out of town. Did not want to reschedule, he states he is feeling fine and will call us when he feels the need to follow up.

## 2022-10-15 ENCOUNTER — HEALTH MAINTENANCE LETTER (OUTPATIENT)
Age: 62
End: 2022-10-15

## 2023-03-26 ENCOUNTER — HEALTH MAINTENANCE LETTER (OUTPATIENT)
Age: 63
End: 2023-03-26

## 2024-05-26 ENCOUNTER — HEALTH MAINTENANCE LETTER (OUTPATIENT)
Age: 64
End: 2024-05-26

## 2024-09-26 ENCOUNTER — TELEPHONE (OUTPATIENT)
Dept: FAMILY MEDICINE | Facility: CLINIC | Age: 64
End: 2024-09-26

## 2024-09-26 NOTE — TELEPHONE ENCOUNTER
"Patient's partner, \"Jurgen\", calling, consent to communicate on file.  Patient is in the background.    Patient has had several bouts of colds recently, says his wrists and ankles have been red and swollen, very painful, especially the wrists.   Has chronic left lower leg venous insufficiency, has a wound that never heals present for 40 years, [place], having fever 2 weeks ago, continues to feel achy and tired.  Multiple complaints, issues worsen and improve.  Wonders if his lymes treated a few years ago has recurred or caused issues.    Says wrist pain is about a level \"3\" to his right wrist.   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    Has not been seen in clinic for about 3 years, was being seen by [facility] until about a year ago for the left leg issue.    Right arm was actually red all the way up the arm a week ago, now is improved and more in the wrist.    He is not on any meds, takes advil for pain.    Would like to get seen at  clinic again.   No openings today or tomorrow, wonders if Dr. Gregory would see him in the \"same day\" spot tomorrow (9:00 am with 8:40 am arrival).    I advised I can send message to provider to inquire but they can simply to to  today if he wants to get evaluated and some labs initiated, then could schedule follow up and re-establish care on a less urgent basis.    They verbalized understanding but still want to see if Dr. Gregory will see him tomorrow (he really liked Dr. Gregory).    Routed to provider to advise.    Dominique MARQUES RN  [facility] Triage        "

## 2024-09-27 ENCOUNTER — ANCILLARY PROCEDURE (OUTPATIENT)
Dept: GENERAL RADIOLOGY | Facility: CLINIC | Age: 64
End: 2024-09-27
Attending: FAMILY MEDICINE

## 2024-09-27 ENCOUNTER — OFFICE VISIT (OUTPATIENT)
Dept: FAMILY MEDICINE | Facility: CLINIC | Age: 64
End: 2024-09-27

## 2024-09-27 VITALS
TEMPERATURE: 97.6 F | HEIGHT: 74 IN | RESPIRATION RATE: 18 BRPM | HEART RATE: 63 BPM | BODY MASS INDEX: 35.63 KG/M2 | OXYGEN SATURATION: 97 % | DIASTOLIC BLOOD PRESSURE: 83 MMHG | WEIGHT: 277.6 LBS | SYSTOLIC BLOOD PRESSURE: 119 MMHG

## 2024-09-27 DIAGNOSIS — E66.01 CLASS 2 SEVERE OBESITY DUE TO EXCESS CALORIES WITH SERIOUS COMORBIDITY AND BODY MASS INDEX (BMI) OF 36.0 TO 36.9 IN ADULT (H): ICD-10-CM

## 2024-09-27 DIAGNOSIS — M25.531 PAIN IN BOTH WRISTS: ICD-10-CM

## 2024-09-27 DIAGNOSIS — M25.532 PAIN IN BOTH WRISTS: ICD-10-CM

## 2024-09-27 DIAGNOSIS — M25.40 JOINT SWELLING: ICD-10-CM

## 2024-09-27 DIAGNOSIS — M25.50 POLYARTHRALGIA: Primary | ICD-10-CM

## 2024-09-27 DIAGNOSIS — E66.812 CLASS 2 SEVERE OBESITY DUE TO EXCESS CALORIES WITH SERIOUS COMORBIDITY AND BODY MASS INDEX (BMI) OF 36.0 TO 36.9 IN ADULT (H): ICD-10-CM

## 2024-09-27 LAB
ALBUMIN SERPL BCG-MCNC: 3.8 G/DL (ref 3.5–5.2)
ALBUMIN UR-MCNC: NEGATIVE MG/DL
ALP SERPL-CCNC: 159 U/L (ref 40–150)
ALT SERPL W P-5'-P-CCNC: 46 U/L (ref 0–70)
ANION GAP SERPL CALCULATED.3IONS-SCNC: 11 MMOL/L (ref 7–15)
APPEARANCE UR: CLEAR
AST SERPL W P-5'-P-CCNC: 28 U/L (ref 0–45)
B BURGDOR IGG+IGM SER QL: 0.79
BASOPHILS # BLD AUTO: 0 10E3/UL (ref 0–0.2)
BASOPHILS NFR BLD AUTO: 0 %
BILIRUB SERPL-MCNC: 0.7 MG/DL
BILIRUB UR QL STRIP: NEGATIVE
BUN SERPL-MCNC: 18.5 MG/DL (ref 8–23)
CALCIUM SERPL-MCNC: 9.3 MG/DL (ref 8.8–10.4)
CHLORIDE SERPL-SCNC: 103 MMOL/L (ref 98–107)
COLOR UR AUTO: YELLOW
CREAT SERPL-MCNC: 0.78 MG/DL (ref 0.67–1.17)
CRP SERPL-MCNC: 44.4 MG/L
EGFRCR SERPLBLD CKD-EPI 2021: >90 ML/MIN/1.73M2
EOSINOPHIL # BLD AUTO: 0.1 10E3/UL (ref 0–0.7)
EOSINOPHIL NFR BLD AUTO: 1 %
ERYTHROCYTE [DISTWIDTH] IN BLOOD BY AUTOMATED COUNT: 12.3 % (ref 10–15)
ERYTHROCYTE [SEDIMENTATION RATE] IN BLOOD BY WESTERGREN METHOD: 31 MM/HR (ref 0–20)
GLUCOSE SERPL-MCNC: 101 MG/DL (ref 70–99)
GLUCOSE UR STRIP-MCNC: NEGATIVE MG/DL
HCO3 SERPL-SCNC: 21 MMOL/L (ref 22–29)
HCT VFR BLD AUTO: 46 % (ref 40–53)
HGB BLD-MCNC: 15.1 G/DL (ref 13.3–17.7)
HGB UR QL STRIP: NEGATIVE
IMM GRANULOCYTES # BLD: 0 10E3/UL
IMM GRANULOCYTES NFR BLD: 0 %
KETONES UR STRIP-MCNC: NEGATIVE MG/DL
LEUKOCYTE ESTERASE UR QL STRIP: NEGATIVE
LYMPHOCYTES # BLD AUTO: 2.4 10E3/UL (ref 0.8–5.3)
LYMPHOCYTES NFR BLD AUTO: 25 %
MCH RBC QN AUTO: 28.9 PG (ref 26.5–33)
MCHC RBC AUTO-ENTMCNC: 32.8 G/DL (ref 31.5–36.5)
MCV RBC AUTO: 88 FL (ref 78–100)
MONOCYTES # BLD AUTO: 0.9 10E3/UL (ref 0–1.3)
MONOCYTES NFR BLD AUTO: 9 %
NEUTROPHILS # BLD AUTO: 6.2 10E3/UL (ref 1.6–8.3)
NEUTROPHILS NFR BLD AUTO: 65 %
NITRATE UR QL: NEGATIVE
PH UR STRIP: 6 [PH] (ref 5–7)
PLATELET # BLD AUTO: 254 10E3/UL (ref 150–450)
POTASSIUM SERPL-SCNC: 4.2 MMOL/L (ref 3.4–5.3)
PROT SERPL-MCNC: 8.1 G/DL (ref 6.4–8.3)
RBC # BLD AUTO: 5.23 10E6/UL (ref 4.4–5.9)
RHEUMATOID FACT SERPL-ACNC: 18 IU/ML
SODIUM SERPL-SCNC: 135 MMOL/L (ref 135–145)
SP GR UR STRIP: >=1.03 (ref 1–1.03)
URATE SERPL-MCNC: 6.5 MG/DL (ref 3.4–7)
UROBILINOGEN UR STRIP-ACNC: 0.2 E.U./DL
WBC # BLD AUTO: 9.7 10E3/UL (ref 4–11)

## 2024-09-27 PROCEDURE — 86235 NUCLEAR ANTIGEN ANTIBODY: CPT | Mod: 59 | Performed by: FAMILY MEDICINE

## 2024-09-27 PROCEDURE — 86140 C-REACTIVE PROTEIN: CPT | Performed by: FAMILY MEDICINE

## 2024-09-27 PROCEDURE — 86039 ANTINUCLEAR ANTIBODIES (ANA): CPT | Performed by: FAMILY MEDICINE

## 2024-09-27 PROCEDURE — 81003 URINALYSIS AUTO W/O SCOPE: CPT | Performed by: FAMILY MEDICINE

## 2024-09-27 PROCEDURE — 73110 X-RAY EXAM OF WRIST: CPT | Mod: TC | Performed by: RADIOLOGY

## 2024-09-27 PROCEDURE — 36415 COLL VENOUS BLD VENIPUNCTURE: CPT | Performed by: FAMILY MEDICINE

## 2024-09-27 PROCEDURE — G2211 COMPLEX E/M VISIT ADD ON: HCPCS | Performed by: FAMILY MEDICINE

## 2024-09-27 PROCEDURE — 99205 OFFICE O/P NEW HI 60 MIN: CPT | Performed by: FAMILY MEDICINE

## 2024-09-27 PROCEDURE — 85025 COMPLETE CBC W/AUTO DIFF WBC: CPT | Performed by: FAMILY MEDICINE

## 2024-09-27 PROCEDURE — 86038 ANTINUCLEAR ANTIBODIES: CPT | Performed by: FAMILY MEDICINE

## 2024-09-27 PROCEDURE — 86200 CCP ANTIBODY: CPT | Performed by: FAMILY MEDICINE

## 2024-09-27 PROCEDURE — 99000 SPECIMEN HANDLING OFFICE-LAB: CPT | Performed by: FAMILY MEDICINE

## 2024-09-27 PROCEDURE — 86666 EHRLICHIA ANTIBODY: CPT | Mod: 90 | Performed by: FAMILY MEDICINE

## 2024-09-27 PROCEDURE — 86235 NUCLEAR ANTIGEN ANTIBODY: CPT | Performed by: FAMILY MEDICINE

## 2024-09-27 PROCEDURE — 86431 RHEUMATOID FACTOR QUANT: CPT | Performed by: FAMILY MEDICINE

## 2024-09-27 PROCEDURE — 85652 RBC SED RATE AUTOMATED: CPT | Performed by: FAMILY MEDICINE

## 2024-09-27 PROCEDURE — 80053 COMPREHEN METABOLIC PANEL: CPT | Performed by: FAMILY MEDICINE

## 2024-09-27 PROCEDURE — 84550 ASSAY OF BLOOD/URIC ACID: CPT | Performed by: FAMILY MEDICINE

## 2024-09-27 PROCEDURE — 86618 LYME DISEASE ANTIBODY: CPT | Performed by: FAMILY MEDICINE

## 2024-09-27 PROCEDURE — 86753 PROTOZOA ANTIBODY NOS: CPT | Mod: 90 | Performed by: FAMILY MEDICINE

## 2024-09-27 RX ORDER — PREDNISONE 20 MG/1
TABLET ORAL
Qty: 20 TABLET | Refills: 0 | Status: SHIPPED | OUTPATIENT
Start: 2024-09-27

## 2024-09-27 ASSESSMENT — PATIENT HEALTH QUESTIONNAIRE - PHQ9
SUM OF ALL RESPONSES TO PHQ QUESTIONS 1-9: 11
10. IF YOU CHECKED OFF ANY PROBLEMS, HOW DIFFICULT HAVE THESE PROBLEMS MADE IT FOR YOU TO DO YOUR WORK, TAKE CARE OF THINGS AT HOME, OR GET ALONG WITH OTHER PEOPLE: SOMEWHAT DIFFICULT
SUM OF ALL RESPONSES TO PHQ QUESTIONS 1-9: 11

## 2024-09-27 NOTE — PROGRESS NOTES
Assessment & Plan       ICD-10-CM    1. Polyarthralgia  M25.50 CRP inflammation     Erythrocyte sedimentation rate auto     Rheumatoid factor     Uric acid     UA Macroscopic with reflex to Microscopic and Culture     CBC with Platelets & Differential     Irene ARSALAN Antibody IgG     Adult Rheumatology  Referral     SSA Ro ARSALAN Antibody IgG     Comprehensive metabolic panel     Babesia antibody IgG IgM     Anaplasma phagocytoph antibody IgG IgM     LYME DISEASE TOTAL ANTIBODIES WITH REFLEX TO CONFIRMATION     Anti Nuclear Jessica IgG by IFA with Reflex     Cyclic Citrullinated Peptide Antibody IgG     predniSONE (DELTASONE) 20 MG tablet     CRP inflammation     Erythrocyte sedimentation rate auto     Rheumatoid factor     Uric acid     UA Macroscopic with reflex to Microscopic and Culture     CBC with Platelets & Differential     Irene ARSALAN Antibody IgG     SSA Ro ARSALAN Antibody IgG     Comprehensive metabolic panel     Babesia antibody IgG IgM     Anaplasma phagocytoph antibody IgG IgM     LYME DISEASE TOTAL ANTIBODIES WITH REFLEX TO CONFIRMATION     Anti Nuclear Jessica IgG by IFA with Reflex     Cyclic Citrullinated Peptide Antibody IgG      2. Joint swelling  M25.40 CRP inflammation     Erythrocyte sedimentation rate auto     Rheumatoid factor     Uric acid     UA Macroscopic with reflex to Microscopic and Culture     CBC with Platelets & Differential     Irene ARSALAN Antibody IgG     Adult Rheumatology  Referral     SSA Ro ARSALAN Antibody IgG     Comprehensive metabolic panel     Babesia antibody IgG IgM     Anaplasma phagocytoph antibody IgG IgM     LYME DISEASE TOTAL ANTIBODIES WITH REFLEX TO CONFIRMATION     Anti Nuclear Jessica IgG by IFA with Reflex     Cyclic Citrullinated Peptide Antibody IgG     predniSONE (DELTASONE) 20 MG tablet     CRP inflammation     Erythrocyte sedimentation rate auto     Rheumatoid factor     Uric acid     UA Macroscopic with reflex to Microscopic and Culture     CBC with Platelets &  "Differential     Irene ARSALAN Antibody IgG     SSA Ro ARSALAN Antibody IgG     Comprehensive metabolic panel     Babesia antibody IgG IgM     Anaplasma phagocytoph antibody IgG IgM     LYME DISEASE TOTAL ANTIBODIES WITH REFLEX TO CONFIRMATION     Anti Nuclear Jessica IgG by IFA with Reflex     Cyclic Citrullinated Peptide Antibody IgG      3. Pain in both wrists  M25.531 XR Wrist Bilateral G/E 3 Views    M25.532 Comprehensive metabolic panel     Babesia antibody IgG IgM     Anaplasma phagocytoph antibody IgG IgM     LYME DISEASE TOTAL ANTIBODIES WITH REFLEX TO CONFIRMATION     Anti Nuclear Jessica IgG by IFA with Reflex     Cyclic Citrullinated Peptide Antibody IgG     Comprehensive metabolic panel     Babesia antibody IgG IgM     Anaplasma phagocytoph antibody IgG IgM     LYME DISEASE TOTAL ANTIBODIES WITH REFLEX TO CONFIRMATION     Anti Nuclear Jessica IgG by IFA with Reflex     Cyclic Citrullinated Peptide Antibody IgG      4. Class 2 severe obesity due to excess calories with serious comorbidity and body mass index (BMI) of 36.0 to 36.9 in adult (H)  E66.01     Z68.36           The longitudinal plan of care for the diagnosis(es)/condition(s) as documented were addressed during this visit. Due to the added complexity in care, I will continue to support Mauro in the subsequent management and with ongoing continuity of care.       A total of 52 minutes spent face-to-face with greater than 50% of the time spent in counseling and coordinating cares of the issues above         BMI  Estimated body mass index is 36.13 kg/m  as calculated from the following:    Height as of this encounter: 1.867 m (6' 1.5\").    Weight as of this encounter: 125.9 kg (277 lb 9.6 oz).   Weight management plan: Discussed healthy diet and exercise guidelines    Depression Screening Follow Up        9/27/2024     8:48 AM   PHQ   PHQ-9 Total Score 11   Q9: Thoughts of better off dead/self-harm past 2 weeks Not at all         9/27/2024     8:48 AM   Last PHQ-9 "   1.  Little interest or pleasure in doing things 3   2.  Feeling down, depressed, or hopeless 2   3.  Trouble falling or staying asleep, or sleeping too much 2   4.  Feeling tired or having little energy 2   5.  Poor appetite or overeating 1   6.  Feeling bad about yourself 0   7.  Trouble concentrating 0   8.  Moving slowly or restless 1   Q9: Thoughts of better off dead/self-harm past 2 weeks 0   PHQ-9 Total Score 11         Follow Up Actions Taken  Crisis resource information provided in After Visit Summary  Patient counseled, no additional follow up at this time.       There are no Patient Instructions on file for this visit.    Adriana Wade is a 63 year old, presenting for the following health issues:  URI and Patient Request (Discuss joint pain and swelling and open wound on leg)        9/27/2024     9:05 AM   Additional Questions   Roomed by Marley   Accompanied by Spouse     History of Present Illness       Reason for visit:  Swollen painful wrists and ankles  Symptom onset:  3-4 weeks ago  Symptoms include:  Pain swelling redness headache sweating  Symptom intensity:  Moderate  Symptom progression:  Staying the same  Had these symptoms before:  Yes  Has tried/received treatment for these symptoms:  Yes  Previous treatment was successful:  Yes  Prior treatment description:  Antibiotics  What makes it better:  Rest   He is taking medications regularly.     Bilateral wrist swelling and pain x 2 weeks  R>L  Spontaneous  History of lyme disease 2021- treated  Works in [employer] area every weekend  Family history of gout (brother) and psoriatic arthritis (mother)  Did have elevated rheumatoid factor in 2021    3 weeks ago had flu-like symptoms    Has chronic left lower leg wound   [place]  No changes to the wound      BP Readings from Last 6 Encounters:   09/27/24 119/83   11/09/21 130/87   11/09/21 130/87   09/09/21 124/86   08/23/21 114/78   08/10/21 108/77                   Review  "of Systems  Constitutional, HEENT, cardiovascular, pulmonary, gi and gu systems are negative, except as otherwise noted.      Objective    /83   Pulse 63   Temp 97.6  F (36.4  C) (Temporal)   Resp 18   Ht 1.867 m (6' 1.5\")   Wt 125.9 kg (277 lb 9.6 oz)   SpO2 97%   BMI 36.13 kg/m    Body mass index is 36.13 kg/m .  Physical Exam  Constitutional:       General: He is not in acute distress.     Appearance: Normal appearance. He is well-developed. He is not ill-appearing.   HENT:      Head: Normocephalic and atraumatic.      Right Ear: External ear normal.      Left Ear: External ear normal.      Nose: Nose normal.   Eyes:      General: No scleral icterus.     Extraocular Movements: Extraocular movements intact.      Conjunctiva/sclera: Conjunctivae normal.   Cardiovascular:      Rate and Rhythm: Normal rate.   Pulmonary:      Effort: Pulmonary effort is normal.   Musculoskeletal:      Cervical back: Normal range of motion and neck supple.      Comments: Bilateral wrist swelling, limited ROM due to pain and swelling, tenderness at base of 5th digit bilaterally over TFCC, pain with resisted rotation of the wrist   Skin:     General: Skin is warm and dry.   Neurological:      Mental Status: He is alert and oriented to person, place, and time.   Psychiatric:         Behavior: Behavior normal.         Thought Content: Thought content normal.         Judgment: Judgment normal.                    Signed Electronically by: Jn Marley MD    "

## 2024-09-30 LAB
A PHAGOCYTOPH IGG TITR SER IF: NORMAL {TITER}
A PHAGOCYTOPH IGM TITR SER IF: NORMAL {TITER}
ANA PAT SER IF-IMP: ABNORMAL
ANA SER QL IF: POSITIVE
ANA TITR SER IF: ABNORMAL {TITER}
B MICROTI IGG TITR SER: NORMAL {TITER}
B MICROTI IGM TITR SER: NORMAL {TITER}
CCP AB SER IA-ACNC: 1.4 U/ML
ENA SM IGG SER IA-ACNC: <0.7 U/ML
ENA SM IGG SER IA-ACNC: NEGATIVE
ENA SS-A AB SER IA-ACNC: 1.9 U/ML
ENA SS-A AB SER IA-ACNC: NEGATIVE

## 2025-06-14 ENCOUNTER — HEALTH MAINTENANCE LETTER (OUTPATIENT)
Age: 65
End: 2025-06-14

## (undated) DEVICE — KIT ENDO FIRST STEP DISINFECTANT 200ML W/POUCH EP-4

## (undated) DEVICE — PREP CHLORAPREP 26ML TINTED ORANGE  260815

## (undated) DEVICE — PAD CHUX UNDERPAD 23X24" 7136